# Patient Record
Sex: FEMALE | Race: WHITE | Employment: FULL TIME | URBAN - METROPOLITAN AREA
[De-identification: names, ages, dates, MRNs, and addresses within clinical notes are randomized per-mention and may not be internally consistent; named-entity substitution may affect disease eponyms.]

---

## 2017-01-03 ENCOUNTER — AMBULATORY - HEALTHEAST (OUTPATIENT)
Dept: EDUCATION SERVICES | Facility: CLINIC | Age: 37
End: 2017-01-03

## 2017-01-03 ENCOUNTER — COMMUNICATION - HEALTHEAST (OUTPATIENT)
Dept: FAMILY MEDICINE | Facility: CLINIC | Age: 37
End: 2017-01-03

## 2017-01-03 DIAGNOSIS — O24.414 INSULIN CONTROLLED GESTATIONAL DIABETES MELLITUS (GDM) DURING PREGNANCY, ANTEPARTUM: ICD-10-CM

## 2017-01-04 ENCOUNTER — COMMUNICATION - HEALTHEAST (OUTPATIENT)
Dept: EDUCATION SERVICES | Facility: CLINIC | Age: 37
End: 2017-01-04

## 2017-01-08 ENCOUNTER — COMMUNICATION - HEALTHEAST (OUTPATIENT)
Dept: EDUCATION SERVICES | Facility: CLINIC | Age: 37
End: 2017-01-08

## 2017-01-08 DIAGNOSIS — O24.410 DIET CONTROLLED GESTATIONAL DIABETES MELLITUS (GDM), ANTEPARTUM: ICD-10-CM

## 2017-01-09 ENCOUNTER — COMMUNICATION - HEALTHEAST (OUTPATIENT)
Dept: ENDOCRINOLOGY | Facility: CLINIC | Age: 37
End: 2017-01-09

## 2017-01-09 ENCOUNTER — OFFICE VISIT - HEALTHEAST (OUTPATIENT)
Dept: EDUCATION SERVICES | Facility: CLINIC | Age: 37
End: 2017-01-09

## 2017-01-09 ENCOUNTER — OFFICE VISIT - HEALTHEAST (OUTPATIENT)
Dept: ENDOCRINOLOGY | Facility: CLINIC | Age: 37
End: 2017-01-09

## 2017-01-09 DIAGNOSIS — O24.414 INSULIN CONTROLLED GESTATIONAL DIABETES MELLITUS (GDM) IN THIRD TRIMESTER: ICD-10-CM

## 2017-01-09 DIAGNOSIS — O24.419 GESTATIONAL DIABETES: ICD-10-CM

## 2017-01-09 ASSESSMENT — MIFFLIN-ST. JEOR: SCORE: 1503.55

## 2017-01-11 ENCOUNTER — COMMUNICATION - HEALTHEAST (OUTPATIENT)
Dept: EDUCATION SERVICES | Facility: CLINIC | Age: 37
End: 2017-01-11

## 2017-01-13 ENCOUNTER — AMBULATORY - HEALTHEAST (OUTPATIENT)
Dept: SCHEDULING | Facility: CLINIC | Age: 37
End: 2017-01-13

## 2017-01-13 DIAGNOSIS — O24.419 GDM (GESTATIONAL DIABETES MELLITUS): ICD-10-CM

## 2017-01-16 ENCOUNTER — HOSPITAL ENCOUNTER (OUTPATIENT)
Dept: ULTRASOUND IMAGING | Facility: HOSPITAL | Age: 37
Discharge: HOME | End: 2017-01-16
Attending: FAMILY MEDICINE

## 2017-01-16 ENCOUNTER — HOSPITAL ENCOUNTER (OUTPATIENT)
Dept: OBGYN | Facility: HOSPITAL | Age: 37
Discharge: HOME | End: 2017-01-16
Attending: FAMILY MEDICINE | Admitting: FAMILY MEDICINE

## 2017-01-16 DIAGNOSIS — O24.419 GDM (GESTATIONAL DIABETES MELLITUS): ICD-10-CM

## 2017-01-23 ENCOUNTER — HOSPITAL ENCOUNTER (OUTPATIENT)
Dept: ULTRASOUND IMAGING | Facility: CLINIC | Age: 37
Discharge: HOME | End: 2017-01-23
Attending: FAMILY MEDICINE

## 2017-01-23 ENCOUNTER — HOSPITAL ENCOUNTER (OUTPATIENT)
Dept: ADMINISTRATIVE | Facility: OTHER | Age: 37
Discharge: HOME | End: 2017-01-23
Attending: FAMILY MEDICINE | Admitting: FAMILY MEDICINE

## 2017-01-23 DIAGNOSIS — O24.419 GDM (GESTATIONAL DIABETES MELLITUS): ICD-10-CM

## 2017-01-24 ENCOUNTER — OFFICE VISIT - HEALTHEAST (OUTPATIENT)
Dept: ENDOCRINOLOGY | Facility: CLINIC | Age: 37
End: 2017-01-24

## 2017-01-24 ENCOUNTER — OFFICE VISIT - HEALTHEAST (OUTPATIENT)
Dept: EDUCATION SERVICES | Facility: CLINIC | Age: 37
End: 2017-01-24

## 2017-01-24 DIAGNOSIS — O24.414 INSULIN CONTROLLED GESTATIONAL DIABETES MELLITUS (GDM) IN THIRD TRIMESTER: ICD-10-CM

## 2017-01-24 ASSESSMENT — MIFFLIN-ST. JEOR: SCORE: 1515.34

## 2017-01-31 ENCOUNTER — RECORDS - HEALTHEAST (OUTPATIENT)
Dept: ADMINISTRATIVE | Facility: OTHER | Age: 37
End: 2017-01-31

## 2017-02-02 ENCOUNTER — HOSPITAL ENCOUNTER (OUTPATIENT)
Dept: ADMINISTRATIVE | Facility: OTHER | Age: 37
Discharge: HOME | End: 2017-02-02
Attending: FAMILY MEDICINE | Admitting: FAMILY MEDICINE

## 2017-02-02 ENCOUNTER — HOSPITAL ENCOUNTER (OUTPATIENT)
Dept: ULTRASOUND IMAGING | Facility: CLINIC | Age: 37
Discharge: HOME | End: 2017-02-02
Attending: FAMILY MEDICINE

## 2017-02-02 DIAGNOSIS — O24.419 GDM (GESTATIONAL DIABETES MELLITUS): ICD-10-CM

## 2017-02-06 ENCOUNTER — OFFICE VISIT - HEALTHEAST (OUTPATIENT)
Dept: ENDOCRINOLOGY | Facility: CLINIC | Age: 37
End: 2017-02-06

## 2017-02-06 ENCOUNTER — OFFICE VISIT - HEALTHEAST (OUTPATIENT)
Dept: EDUCATION SERVICES | Facility: CLINIC | Age: 37
End: 2017-02-06

## 2017-02-06 DIAGNOSIS — O24.414 INSULIN CONTROLLED GESTATIONAL DIABETES MELLITUS (GDM) IN THIRD TRIMESTER: ICD-10-CM

## 2017-02-06 ASSESSMENT — MIFFLIN-ST. JEOR: SCORE: 1521.69

## 2017-02-09 ENCOUNTER — HOSPITAL ENCOUNTER (OUTPATIENT)
Dept: ULTRASOUND IMAGING | Facility: CLINIC | Age: 37
Discharge: HOME | End: 2017-02-09
Attending: FAMILY MEDICINE

## 2017-02-09 ENCOUNTER — HOSPITAL ENCOUNTER (OUTPATIENT)
Dept: ADMINISTRATIVE | Facility: OTHER | Age: 37
Discharge: HOME | End: 2017-02-09
Attending: FAMILY MEDICINE | Admitting: FAMILY MEDICINE

## 2017-02-09 ENCOUNTER — HOSPITAL ENCOUNTER (OUTPATIENT)
Dept: LABOR AND DELIVERY | Facility: CLINIC | Age: 37
Discharge: HOME | End: 2017-02-09
Attending: FAMILY MEDICINE

## 2017-02-09 DIAGNOSIS — O24.419 GDM (GESTATIONAL DIABETES MELLITUS): ICD-10-CM

## 2017-02-09 ASSESSMENT — MIFFLIN-ST. JEOR: SCORE: 1534.51

## 2017-02-13 ENCOUNTER — COMMUNICATION - HEALTHEAST (OUTPATIENT)
Dept: ADMINISTRATIVE | Facility: CLINIC | Age: 37
End: 2017-02-13

## 2017-02-14 ENCOUNTER — HOSPITAL ENCOUNTER (OUTPATIENT)
Dept: ULTRASOUND IMAGING | Facility: CLINIC | Age: 37
Discharge: HOME | End: 2017-02-14
Attending: FAMILY MEDICINE

## 2017-02-14 ENCOUNTER — HOSPITAL ENCOUNTER (OUTPATIENT)
Dept: LABOR AND DELIVERY | Facility: CLINIC | Age: 37
Discharge: HOME | End: 2017-02-14
Attending: FAMILY MEDICINE

## 2017-02-14 ENCOUNTER — COMMUNICATION - HEALTHEAST (OUTPATIENT)
Dept: EDUCATION SERVICES | Facility: CLINIC | Age: 37
End: 2017-02-14

## 2017-02-14 ENCOUNTER — HOSPITAL ENCOUNTER (OUTPATIENT)
Dept: ADMINISTRATIVE | Facility: OTHER | Age: 37
Discharge: HOME | End: 2017-02-14
Attending: FAMILY MEDICINE | Admitting: FAMILY MEDICINE

## 2017-02-14 DIAGNOSIS — O24.419 GDM (GESTATIONAL DIABETES MELLITUS): ICD-10-CM

## 2017-02-14 DIAGNOSIS — O24.419 GESTATIONAL DIABETES: ICD-10-CM

## 2017-02-23 ENCOUNTER — HOSPITAL ENCOUNTER (OUTPATIENT)
Dept: LABOR AND DELIVERY | Facility: CLINIC | Age: 37
Discharge: HOME | End: 2017-02-23
Attending: FAMILY MEDICINE

## 2017-02-23 ENCOUNTER — HOSPITAL ENCOUNTER (OUTPATIENT)
Dept: ADMINISTRATIVE | Facility: OTHER | Age: 37
Discharge: HOME | End: 2017-02-23
Attending: FAMILY MEDICINE | Admitting: FAMILY MEDICINE

## 2017-02-23 ENCOUNTER — HOSPITAL ENCOUNTER (OUTPATIENT)
Dept: ULTRASOUND IMAGING | Facility: CLINIC | Age: 37
Discharge: HOME | End: 2017-02-23
Attending: FAMILY MEDICINE

## 2017-02-23 DIAGNOSIS — O24.419 GDM (GESTATIONAL DIABETES MELLITUS): ICD-10-CM

## 2017-02-27 ENCOUNTER — COMMUNICATION - HEALTHEAST (OUTPATIENT)
Dept: ADMINISTRATIVE | Facility: CLINIC | Age: 37
End: 2017-02-27

## 2017-02-27 ENCOUNTER — RECORDS - HEALTHEAST (OUTPATIENT)
Dept: ADMINISTRATIVE | Facility: OTHER | Age: 37
End: 2017-02-27

## 2017-03-01 ENCOUNTER — HOSPITAL ENCOUNTER (OUTPATIENT)
Dept: ULTRASOUND IMAGING | Facility: CLINIC | Age: 37
Discharge: HOME | End: 2017-03-01
Attending: FAMILY MEDICINE

## 2017-03-01 DIAGNOSIS — O24.419 GDM (GESTATIONAL DIABETES MELLITUS): ICD-10-CM

## 2017-03-03 ENCOUNTER — HOSPITAL ENCOUNTER (OUTPATIENT)
Dept: LABOR AND DELIVERY | Facility: CLINIC | Age: 37
Discharge: HOME | End: 2017-03-03

## 2017-03-04 ENCOUNTER — ANESTHESIA - HEALTHEAST (OUTPATIENT)
Dept: ADMINISTRATIVE | Facility: OTHER | Age: 37
End: 2017-03-04

## 2017-03-05 ENCOUNTER — HOME CARE/HOSPICE - HEALTHEAST (OUTPATIENT)
Dept: HOME HEALTH SERVICES | Facility: HOME HEALTH | Age: 37
End: 2017-03-05

## 2017-03-13 ENCOUNTER — COMMUNICATION - HEALTHEAST (OUTPATIENT)
Dept: OBGYN | Facility: CLINIC | Age: 37
End: 2017-03-13

## 2017-10-26 ENCOUNTER — OFFICE VISIT (OUTPATIENT)
Dept: FAMILY MEDICINE | Facility: CLINIC | Age: 37
End: 2017-10-26
Payer: COMMERCIAL

## 2017-10-26 VITALS
WEIGHT: 148 LBS | DIASTOLIC BLOOD PRESSURE: 63 MMHG | HEART RATE: 88 BPM | HEIGHT: 67 IN | BODY MASS INDEX: 23.23 KG/M2 | OXYGEN SATURATION: 98 % | RESPIRATION RATE: 16 BRPM | TEMPERATURE: 98 F | SYSTOLIC BLOOD PRESSURE: 99 MMHG

## 2017-10-26 DIAGNOSIS — R68.89 FLU-LIKE SYMPTOMS: Primary | ICD-10-CM

## 2017-10-26 LAB
FLUAV+FLUBV AG SPEC QL: NEGATIVE
FLUAV+FLUBV AG SPEC QL: NEGATIVE
SPECIMEN SOURCE: NORMAL

## 2017-10-26 PROCEDURE — 87804 INFLUENZA ASSAY W/OPTIC: CPT | Performed by: NURSE PRACTITIONER

## 2017-10-26 PROCEDURE — 99214 OFFICE O/P EST MOD 30 MIN: CPT | Performed by: NURSE PRACTITIONER

## 2017-10-26 RX ORDER — OSELTAMIVIR PHOSPHATE 75 MG/1
75 CAPSULE ORAL 2 TIMES DAILY
Qty: 10 CAPSULE | Refills: 0 | Status: SHIPPED | OUTPATIENT
Start: 2017-10-26

## 2017-10-26 NOTE — PROGRESS NOTES
"  SUBJECTIVE:   Kristin Page is a 37 year old female who presents to clinic today for the following health issues:      RESPIRATORY SYMPTOMS      Duration: yesterday afternoon     Description  Fever, chills  and headache, bodyaches and cough    Severity: severe    Accompanying signs and symptoms: nausea     History (predisposing factors):  none    Precipitating or alleviating factors: None    Therapies tried and outcome:  Tylenol at 5 am     Problem list and histories reviewed & adjusted, as indicated.  Additional history: as documented    Son has neuroblastoma is starting high dose chemo tomorrow.  Today having eval for bone marrow testing.  2 other kids at home are sick.      Reviewed and updated as needed this visit by clinical staffTobacco  Allergies  Meds  Problems  Med Hx  Surg Hx  Fam Hx  Soc Hx        Reviewed and updated as needed this visit by Provider  Tobacco  Allergies  Meds  Problems  Med Hx  Surg Hx  Fam Hx  Soc Hx          ROS:  Constitutional, HEENT, cardiovascular, pulmonary, GI, , musculoskeletal, neuro, skin, endocrine and psych systems are negative, except as otherwise noted.      OBJECTIVE:   BP 99/63  Pulse 88  Temp 98  F (36.7  C) (Oral)  Resp 16  Ht 5' 7\" (1.702 m)  Wt 148 lb (67.1 kg)  SpO2 98%  Breastfeeding? Yes  BMI 23.18 kg/m2  Body mass index is 23.18 kg/(m^2).  GENERAL: healthy, alert and no distress  EYES: Eyes grossly normal to inspection, PERRL and conjunctivae and sclerae normal  HENT: ear canals and TM's normal, nose and mouth without ulcers or lesions  NECK: no adenopathy, no asymmetry, masses, or scars and thyroid normal to palpation  RESP: lungs clear to auscultation - no rales, rhonchi or wheezes  CV: regular rate and rhythm, normal S1 S2, no S3 or S4, no murmur, click or rub, no peripheral edema and peripheral pulses strong  ABDOMEN: soft, nontender, no hepatosplenomegaly, no masses and bowel sounds normal  MS: no gross musculoskeletal defects " noted, no edema  SKIN: no suspicious lesions or rashes    Diagnostic Test Results:  Results for orders placed or performed in visit on 10/26/17 (from the past 24 hour(s))   Influenza A/B antigen   Result Value Ref Range    Influenza A/B Agn Specimen Nasopharyngeal     Influenza A Negative NEG^Negative    Influenza B Negative NEG^Negative       ASSESSMENT/PLAN:       ICD-10-CM    1. Flu-like symptoms R68.89 Influenza A/B antigen     oseltamivir (TAMIFLU) 75 MG capsule     I think this is primarily related to a viral illness given the various associated symptoms.  Went over the treatment of viral illnesses, which is primarily supportive.    Recheck if not improving as expected  Normal exam.  Negative flu swab.  Will treat with tamiflu based on sons diagnosis/immune status.  F/u PRN.      See Patient Instructions    WEI Reveles CNP  [facility]  tamiflu

## 2017-10-26 NOTE — MR AVS SNAPSHOT
"              After Visit Summary   10/26/2017    Kristin Page    MRN: [id number]           Patient Information     Date Of Birth          1980        Visit Information        Provider Department      10/26/2017 9:20 AM Bonnie Bello APRN CNP [facility]        Today's Diagnoses     Flu-like symptoms    -  1       Follow-ups after your visit        Who to contact     If you have questions or need follow up information about today's clinic visit or your schedule please contact [facility] directly at [phone].  Normal or non-critical lab and imaging results will be communicated to you by [employer] System[employer], letter or phone within 4 business days after the clinic has received the results. If you do not hear from us within 7 days, please contact the clinic through [employer] System[employer]art or phone. If you have a critical or abnormal lab result, we will notify you by phone as soon as possible.  Submit refill requests through [employer] or call your pharmacy and they will forward the refill request to us. Please allow 3 business days for your refill to be completed.          Additional Information About Your Visit        MyChart Information     Tilt lets you send messages to your doctor, view your test results, renew your prescriptions, schedule appointments and more. To sign up, go to [email or web address] . Click on \"Log in\" on the left side of the screen, which will take you to the Welcome page. Then click on \"Sign up Now\" on the right side of the page.     You will be asked to enter the access code listed below, as well as some personal information. Please follow the directions to create your username and password.     Your access code is: [email or web address]  Expires: 2018 10:33 AM     Your access code will  in 90 days. If you need help or a new code, please call your [facility] or [phone].        Care EveryWhere ID     This is your Care EveryWhere ID. This could be used " "by other organizations to access your [place] medical records  [id number]        Your Vitals Were     Pulse Temperature Respirations Height Pulse Oximetry Breastfeeding?    88 98  F (36.7  C) (Oral) 16 5' 7\" (1.702 m) 98% Yes    BMI (Body Mass Index)                   23.18 kg/m2            Blood Pressure from Last 3 Encounters:   10/26/17 99/63   11/04/16 109/69   09/28/16 93/60    Weight from Last 3 Encounters:   10/26/17 148 lb (67.1 kg)   11/04/16 168 lb 1.6 oz (76.2 kg)   09/28/16 157 lb (71.2 kg)              We Performed the Following     Influenza A/B antigen          Today's Medication Changes          These changes are accurate as of: 10/26/17 10:33 AM.  If you have any questions, ask your nurse or doctor.               Start taking these medicines.        Dose/Directions    oseltamivir 75 MG capsule   Commonly known as:  TAMIFLU   Used for:  Flu-like symptoms   Started by:  Bonnie Bello APRN CNP        Dose:  75 mg   Take 1 capsule (75 mg) by mouth 2 times daily   Quantity:  10 capsule   Refills:  0            Where to get your medicines      These medications were sent to [facility] - [place], [place] - [address]  [address], [place] [address]     Phone:  [phone]     oseltamivir 75 MG capsule                Primary Care Provider    Physician No Ref-Primary       NO REF-PRIMARY PHYSICIAN        Equal Access to Services     MALATHI CASTILLO AH: Hadii geovanny huio Sotesha, waaxda luqadaha, qaybta kaalmada adeegyada, francisco javier merino . So [facility] [phone].    ATENCIÓN: Si habla español, tiene a valladares disposición servicios gratuitos de asistencia lingüística. James al [phone].    We comply with applicable federal civil rights laws and [place] laws. We do not discriminate on the basis of race, color, national origin, age, disability, sex, sexual orientation, or gender identity.            Thank you!     Thank you for choosing [place] [facility]  for your care. Our goal is always to provide you with excellent care. Hearing back from our patients is one way we can continue to improve our services. Please take a few minutes to complete the written survey that you may receive in the mail after your visit with us. Thank you!             Your Updated Medication List - Protect others around you: Learn how to safely use, store and throw away your medicines at www.disposemymeds.org.          This list is accurate as of: 10/26/17 10:33 AM.  Always use your most recent med list.                   Brand Name Dispense Instructions for use Diagnosis    OMEGA-3 FISH OIL PO      Take 1 g by mouth    Normal pregnancy in multigravida in second trimester       oseltamivir 75 MG capsule    TAMIFLU    10 capsule    Take 1 capsule (75 mg) by mouth 2 times daily    Flu-like symptoms       PRENATAL VITAMINS PO      Take 1 tablet by mouth daily.

## 2017-10-26 NOTE — NURSING NOTE
"Chief Complaint   Patient presents with     Sick       Initial BP 99/63  Pulse 88  Temp 98  F (36.7  C) (Oral)  Resp 16  Ht 5' 7\" (1.702 m)  Wt 148 lb (67.1 kg)  SpO2 98%  Breastfeeding? Yes  BMI 23.18 kg/m2 Estimated body mass index is 23.18 kg/(m^2) as calculated from the following:    Height as of this encounter: 5' 7\" (1.702 m).    Weight as of this encounter: 148 lb (67.1 kg).  Medication Reconciliation: complete     Juan Daniel Alcala MA       "

## 2017-10-27 ENCOUNTER — CARE COORDINATION (OUTPATIENT)
Dept: TRANSPLANT | Facility: CLINIC | Age: 37
End: 2017-10-27

## 2017-10-27 DIAGNOSIS — J06.9 VIRAL UPPER RESPIRATORY TRACT INFECTION: Primary | ICD-10-CM

## 2017-10-27 LAB
FLUAV+FLUBV RNA SPEC QL NAA+PROBE: NEGATIVE
FLUAV+FLUBV RNA SPEC QL NAA+PROBE: NEGATIVE
RSV RNA SPEC NAA+PROBE: NEGATIVE
SPECIMEN SOURCE: NORMAL

## 2017-10-27 PROCEDURE — 87631 RESP VIRUS 3-5 TARGETS: CPT | Performed by: NURSE PRACTITIONER

## 2017-10-27 NOTE — PROGRESS NOTES
Medical release of information obtained 10/26/2017 to allow [facility] Blood and Marrow Transplant team to access medical records to obtain results from viral studies sent.  Medical release to be scanned in to [employer].

## 2018-11-07 ENCOUNTER — ALLIED HEALTH/NURSE VISIT (OUTPATIENT)
Dept: NURSING | Facility: CLINIC | Age: 38
End: 2018-11-07
Payer: COMMERCIAL

## 2018-11-07 DIAGNOSIS — Z23 NEED FOR PROPHYLACTIC VACCINATION AND INOCULATION AGAINST INFLUENZA: Primary | ICD-10-CM

## 2018-11-07 PROCEDURE — 99207 ZZC NO CHARGE NURSE ONLY: CPT

## 2018-11-07 PROCEDURE — 90686 IIV4 VACC NO PRSV 0.5 ML IM: CPT

## 2018-11-07 PROCEDURE — 90471 IMMUNIZATION ADMIN: CPT

## 2018-11-07 NOTE — MR AVS SNAPSHOT
After Visit Summary   11/7/2018    Kristin Page    MRN: [id number]           Patient Information     Date Of Birth          1980        Visit Information        Provider Department      11/7/2018 3:45 PM [facility] NURSE [facility]        Today's Diagnoses     Need for prophylactic vaccination and inoculation against influenza    -  1       Follow-ups after your visit        Who to contact     If you have questions or need follow up information about today's clinic visit or your schedule please contact [facility] directly at [phone].  Normal or non-critical lab and imaging results will be communicated to you by MyChart, letter or phone within 4 business days after the clinic has received the results. If you do not hear from us within 7 days, please contact the clinic through MyChart or phone. If you have a critical or abnormal lab result, we will notify you by phone as soon as possible.  Submit refill requests through [employer] or call your pharmacy and they will forward the refill request to us. Please allow 3 business days for your refill to be completed.          Additional Information About Your Visit        Care EveryWhere ID     This is your Care EveryWhere ID. This could be used by other organizations to access your [place] medical records  [id number]         Blood Pressure from Last 3 Encounters:   10/26/17 99/63   11/04/16 109/69   09/28/16 93/60    Weight from Last 3 Encounters:   10/26/17 148 lb (67.1 kg)   11/04/16 168 lb 1.6 oz (76.2 kg)   09/28/16 157 lb (71.2 kg)              We Performed the Following     FLU VACCINE, SPLIT VIRUS, IM (QUADRIVALENT) [88804]- >3 YRS     Vaccine Administration, Initial [34915]        Primary Care Provider Fax #    Physician No Ref-Primary [phone]       No address on file        Equal Access to Services     MALATHI CASTILLO AH: jose Huffman qaybta kaalmada adeegyada,  francisco javier ayalacora rodgers'aan ah. So [facility] [phone].    ATENCIÓN: Si habla bennie, tiene a valladares disposición servicios gratuitos de asistencia lingüística. James al [phone].    We comply with applicable federal civil rights laws and [place] laws. We do not discriminate on the basis of race, color, national origin, age, disability, sex, sexual orientation, or gender identity.            Thank you!     Thank you for choosing [facility]  for your care. Our goal is always to provide you with excellent care. Hearing back from our patients is one way we can continue to improve our services. Please take a few minutes to complete the written survey that you may receive in the mail after your visit with us. Thank you!             Your Updated Medication List - Protect others around you: Learn how to safely use, store and throw away your medicines at www.disposemymeds.org.          This list is accurate as of 11/7/18  4:03 PM.  Always use your most recent med list.                   Brand Name Dispense Instructions for use Diagnosis    OMEGA-3 FISH OIL PO      Take 1 g by mouth    Normal pregnancy in multigravida in second trimester       oseltamivir 75 MG capsule    TAMIFLU    10 capsule    Take 1 capsule (75 mg) by mouth 2 times daily    Flu-like symptoms       PRENATAL VITAMINS PO      Take 1 tablet by mouth daily.

## 2018-11-07 NOTE — PROGRESS NOTES
Prior to injection verified patient identity using patient's name and date of birth.  Due to injection administration, patient instructed to remain in clinic for 15 minutes  afterwards, and to report any adverse reaction to me immediately.      Injectable Influenza Immunization Documentation    1.  Is the person to be vaccinated sick today?   No    2. Does the person to be vaccinated have an allergy to a component   of the vaccine?   No  Egg Allergy Algorithm Link    3. Has the person to be vaccinated ever had a serious reaction   to influenza vaccine in the past?   No    4. Has the person to be vaccinated ever had Guillain-Barré syndrome?   No    Form completed by Jazmine Uribe MA

## 2019-11-07 ENCOUNTER — ALLIED HEALTH/NURSE VISIT (OUTPATIENT)
Dept: NURSING | Facility: CLINIC | Age: 39
End: 2019-11-07
Payer: COMMERCIAL

## 2019-11-07 DIAGNOSIS — Z23 NEED FOR PROPHYLACTIC VACCINATION AND INOCULATION AGAINST INFLUENZA: Primary | ICD-10-CM

## 2019-11-07 PROCEDURE — 99207 ZZC NO CHARGE NURSE ONLY: CPT

## 2019-11-07 PROCEDURE — 90471 IMMUNIZATION ADMIN: CPT

## 2019-11-07 PROCEDURE — 90682 RIV4 VACC RECOMBINANT DNA IM: CPT

## 2021-03-19 ENCOUNTER — IMMUNIZATION (OUTPATIENT)
Dept: NURSING | Facility: CLINIC | Age: 41
End: 2021-03-19
Payer: COMMERCIAL

## 2021-03-19 PROCEDURE — 91300 PR COVID VAC PFIZER DIL RECON 30 MCG/0.3 ML IM: CPT

## 2021-03-19 PROCEDURE — 0001A PR COVID VAC PFIZER DIL RECON 30 MCG/0.3 ML IM: CPT

## 2021-04-09 ENCOUNTER — IMMUNIZATION (OUTPATIENT)
Dept: NURSING | Facility: CLINIC | Age: 41
End: 2021-04-09
Attending: PHARMACIST
Payer: COMMERCIAL

## 2021-04-09 PROCEDURE — 0002A PR COVID VAC PFIZER DIL RECON 30 MCG/0.3 ML IM: CPT

## 2021-04-09 PROCEDURE — 91300 PR COVID VAC PFIZER DIL RECON 30 MCG/0.3 ML IM: CPT

## 2021-04-17 ENCOUNTER — HEALTH MAINTENANCE LETTER (OUTPATIENT)
Age: 41
End: 2021-04-17

## 2021-05-30 VITALS — WEIGHT: 179.4 LBS | HEIGHT: 67 IN | BODY MASS INDEX: 28.16 KG/M2

## 2021-05-30 VITALS — HEIGHT: 67 IN | BODY MASS INDEX: 28.38 KG/M2 | WEIGHT: 180.8 LBS

## 2021-05-30 VITALS — BODY MASS INDEX: 27.43 KG/M2 | WEIGHT: 181 LBS | HEIGHT: 68 IN

## 2021-05-30 VITALS — BODY MASS INDEX: 27.75 KG/M2 | HEIGHT: 67 IN | WEIGHT: 176.8 LBS

## 2021-05-30 VITALS — BODY MASS INDEX: 27.69 KG/M2 | WEIGHT: 176.8 LBS

## 2021-06-08 NOTE — PROGRESS NOTES
"[facility] GDM Care Plan      Assessment/Plan: pt seen today for f/u. She brings in BG log book, she is up to 16 units at HS and will increase to 18 units if she has another FBG elevation. She is having a couple pp elevations after meals. Most are explainable. Pt was provided with a novolog sample to use 2 units prior to eating these meals. (meals she knows will elevate her for example 2 pieces of toast for breakfast, or eating out.) Reviewed signs and symptoms of hypoglycemia and treatment.   Ketones are normal. Pt feels well. She will call prior to delivery with any questions/concerns or 3 or more elevations/week. Otherwise she does not need to f/u as she is 36 weeks. Discussed pp care.       Time: 30  Visit Type: pregnancy clinic   Provider: Leni Sutherland   Provider's Diagnosis (per referral form): Gestational (648.83)  OGTT:1 hour- 192  EDC: Estimated Date of Delivery: 3/4/17, having a boy  Pregnancy #: 3, 2 boys at home  Previous GDM: No    Medications:   Current Outpatient Prescriptions:      acetone, urine, test Strp, Use 1 per day., Disp: 100 strip, Rfl: 0     blood glucose test strips, Use 1 each As Directed 4 (four) times a day. (Patient taking differently: Use 1 each As Directed 4 (four) times a day. One Touch Verio), Disp: 150 strip, Rfl: 3     generic lancets (ACCU-CHEK FASTCLIX), Use 1 - 6 needle barrel per week., Disp: 50 each, Rfl: 1     insulin detemir (LEVEMIR FLEXTOUCH) 100 unit/mL (3 mL) pen, Inject 8 Units under the skin bedtime. (Patient taking differently: Inject 14 Units under the skin bedtime. ), Disp: 3 mL, Rfl: 0     OMEGA-3/DHA/EPA/FISH OIL (FISH OIL-OMEGA-3 FATTY ACIDS) 300-1,000 mg capsule, Take 2 g by mouth daily., Disp: , Rfl:      pen needle, diabetic 32 gauge x 5/32\" Ndle, Inject 1 Units under the skin daily., Disp: 100 each, Rfl: 1     prenatal vitamin iron-folic acid 27mg-0.8mg (PRENATAL S) 27 mg iron- 800 mcg Tab tablet, Take 1 tablet by mouth daily., Disp: , Rfl:       BG " monitoring goals: Fasting <95; 1 hour post start of meal <140. Test 4 x per day.  Check fasting a.m. ketones: Yes  GDM meal pattern/carb counting taught per guidelines: Yes    Past Goals:  Nutrition: GDM meal plan MET  Activity: Walking after meals when able/staying active MET  Monitoring: BG 4x/day as directed, ketones every morning MET      New Goals:  Nutrition: GDM meal plan   Activity: Walking after meals when able/staying active   Monitoring: BG 4x/day as directed, ketones every morning    DIABETES CARE PLAN AND EDUCATION RECORD    Gestational Diabetes Disease Process/Preconception Care/Management During Pregnancy/Postpartum:Discussed    Meter (per above goals): Discussed    Nutrition Management    Weight: Assessed and Discussed  Portions/Balance: Assessed and Discussed  Carb ID/Count: Assessed and Discussed  Label Reading: Assessed and Discussed  Menu Planning: Assessed and Discussed  Dining Out: Assessed and Discussed  Physical Activity: Assessed and Discussed    Acute Complications: Prevent, Detect, Treat:    Goal Setting and Problem Solving: Assessed and Discussed  Barriers: Assessed and Discussed  Psychosocial Adjustments: Assessed and Discussed      I agree with the aforementioned diabetes plan.  Ruby Zaragozacoco  [facility] Endocrinology  2/6/2017  9:54 AM

## 2021-06-08 NOTE — PROGRESS NOTES
[facility]  ENDOCRINOLOGY    Gestational Diabetes 2017    Kristin Page, 1980, [id number]          Reason for visit      1. Insulin controlled gestational diabetes mellitus (GDM) in third trimester        HPI     Kristin Page is a very pleasant 36 y.o. old female who presents for GESTATIONAL Diabetes Mellitus. She is currently 32 weeks pregnant . Due date is 3/4/17 Diagnosed with GDM based on an OGTT. She hasnot had  GDM in prior pregnancies. She has had two large babies before this pregnancy. One weighed 8 lb 14 oz, and the other weighed 9 lb 5 oz. They are both boys.  Current carbohydrate intake:consistent with recommendations of 30g-60g-60g.  I have reviewed her blood glucose logs and note that the:  Fasting readings are:in range on current regimen  Postprandial readings are:in range on current regimen  Current NPH dose: 14  Current Prandial insulin: 0/0/0  Blood glucose logs/meter brought in and data reviewed and incorporated into decision-making.  Planned delivery at: Unsure  OBGYN: Dr Jose Alcaraz     Therapy/Interventions in the past:  She has been seen by the Diabetes Educator- and has received instruction on carbohydrate counting and  consistency.  Records from referring provider and other sources have also been reviewed and incorporated into decision-making.      TODAY:  Kristin is doing well today and proudly reports that she has been taking 14 units of Levemir at  for some time.  She is having occasional elevations after meals, but not enough to merit starting prandial insulin.  She is not having any ketones.  Baby is moving appropriately and she is having no swelling. They are not planning on doing any induction.     Past Medical History       Patient Active Problem List   Diagnosis     Varicose Veins     Metrorrhagia     Insulin controlled gestational diabetes mellitus (GDM) in third trimester        Past Surgical History     History reviewed. No pertinent past surgical  "history.    Family History     History reviewed. No pertinent family history.    Social History     Social History   Substance Use Topics     Smoking status: Never Smoker     Smokeless tobacco: Never Used     Alcohol use No       Review of Systems     Patient has no polyuria or polydipsia, no chest pain, dyspnea or TIA's, no numbness, tingling or pain in extremities  Remainder negative except as noted in HPI.      Vital Signs     Visit Vitals     BP 96/56 (Patient Site: Right Arm, Patient Position: Sitting, Cuff Size: Adult Regular)     Pulse 70     Ht 5' 7.25\" (1.708 m)     Wt 179 lb 6.4 oz (81.4 kg)     BMI 27.89 kg/m2     Wt Readings from Last 3 Encounters:   01/24/17 179 lb 6.4 oz (81.4 kg)   01/09/17 176 lb 12.8 oz (80.2 kg)   01/03/17 176 lb 12.8 oz (80.2 kg)       Physical Exam     GENERAL: Pleasant, alert, appropriate appearance for age. No acute distress,   HEENT: Normocephalic, atraumatic  NECK: Supple, no masses or  lymph nodes.  THYROID: No nodules or enlargement. Non-tender, no bruit  CHEST/RESPIRATORY: Normal chest wall and respirations. Clear to auscultation.  CARDIOVASCULAR: Regular rate and rhythm. S1, S2, no murmur, click, gallop, or rubs.  ABDOMEN: Gravid   LYMPHATIC: No palpable nodes in neck, supraclavicular,  SKIN: No melanosis,  ecchymosis,  vitiligo. No acanthosis nigricans  NEURO:  Non-focal, normal DTRs; no tremor.  PSYCH: Alert and oriented -appropriate affect. Orientation, judgement and memory appear intact.  MSK: No joint abnormalities, FROM in all four extremities. No kyphosis    Assessment     1. Insulin controlled gestational diabetes mellitus (GDM) in third trimester        Plan     1. GESTATIONAL DIABETES-  Adjust dose as follows:    -NPH [id number]   units. Increase by 2 units every 2 days to keep fasting blood glucose below 95mg/dL  -Novolog 0  units with breakfast  -Novolog 0 units with lunch   -Novolog 0 units with dinner  -Increase by 2 units every 2 days to keep 1 hour after " "meal blood glucose less than 140mg/dL    We reviewed glucose goals of fasting blood glucose <95 mg/dL and 1 hour post prandial blood glucose of <140 mg/dL.    Monitor blood sugar 4 times daily: Fasting  and 1 hour after each meal.  Contact  this clinic [phone] if blood glucose is not within the above-mentioned goals.     We discussed the importance of excellent glycemic control during pregnancy to limit complications such as fetal macrosomia, shoulder dystocia,  hypoglycemia and hyperbilirubinemia.  I have discussed the patient's increased risk of recurrent GDM and/or development of type 2 diabetes later in life.              Ruby Wilson  2017      Lab Results     No results found for: HGBA1C, CREATININE, MICROALBUR    No results found for: CHOL, HDL, LDLCALC, TRIG    No results found for: ALT, AST, GGT, ALKPHOS, BILITOT      Current Medications     Outpatient Medications Prior to Visit   Medication Sig Dispense Refill     acetone, urine, test Strp Use 1 per day. 100 strip 0     blood glucose test strips Use 1 each As Directed 4 (four) times a day. (Patient taking differently: Use 1 each As Directed 4 (four) times a day. One Touch Verio) 150 strip 3     generic lancets (ACCU-CHEK FASTCLIX) Use 1 - 6 needle barrel per week. 50 each 1     insulin detemir (LEVEMIR FLEXTOUCH) 100 unit/mL (3 mL) pen Inject 8 Units under the skin bedtime. (Patient taking differently: Inject 14 Units under the skin bedtime. ) 3 mL 0     OMEGA-3/DHA/EPA/FISH OIL (FISH OIL-OMEGA-3 FATTY ACIDS) 300-1,000 mg capsule Take 2 g by mouth daily.       pen needle, diabetic 32 gauge x \" Ndle Inject 1 Units under the skin daily. 100 each 1     prenatal vitamin iron-folic acid 27mg-0.8mg (PRENATAL S) 27 mg iron- 800 mcg Tab tablet Take 1 tablet by mouth daily.       No facility-administered medications prior to visit.        "

## 2021-06-08 NOTE — PROGRESS NOTES
"[facility] GDM Care Plan      Assessment/Plan: pt seen today for first pregnancy clinic apt. She is taking 8 units of levemir at HS. FBG are . Will increase to 10 units tonight. Instructed to increase by 2 every 2 above 95, this was all written out for her and she verbalized understanding. Pp readings are controlled, a couple elevations/week that are explainable. Ketones have been normal, until today - moderate. Discussed adding a little more CHO. She is eating the full amt of CHO for meals, but not always for snacks. Will call w/ any concerns prior to f/u.       Time: 30 mins.  Visit Type: pregnancy clinic   Provider: Leni Sutherland   Provider's Diagnosis (per referral form): Gestational (648.83)  OGTT:1 hour- 192  EDC: Estimated Date of Delivery: 3/4/17, having a boy  Pregnancy #: 3, 2 boys at home  Previous GDM: No  Medications:   Current Outpatient Prescriptions:      acetone, urine, test Strp, Use 1 per day., Disp: 100 strip, Rfl: 0     blood glucose test (GLUCOSE BLOOD) strips, Use 1 each As Directed as needed. Testing 4x daily; Dispense brand per patient's insurance at pharmacy discretion., Disp: , Rfl:      generic lancets (ACCU-CHEK FASTCLIX), Use 1 - 6 needle barrel per week., Disp: 50 each, Rfl: 1     insulin detemir (LEVEMIR FLEXTOUCH) 100 unit/mL (3 mL) pen, Inject 8 Units under the skin bedtime., Disp: 3 mL, Rfl: 0     OMEGA-3/DHA/EPA/FISH OIL (FISH OIL-OMEGA-3 FATTY ACIDS) 300-1,000 mg capsule, Take 2 g by mouth daily., Disp: , Rfl:      pen needle, diabetic 32 gauge x 5/32\" Ndle, Inject 1 Units under the skin daily., Disp: 100 each, Rfl: 1     prenatal vitamin iron-folic acid 27mg-0.8mg (PRENATAL S) 27 mg iron- 800 mcg Tab tablet, Take 1 tablet by mouth daily., Disp: , Rfl:       BG monitoring goals: Fasting <95; 1 hour post start of meal <140. Test 4 x per day.  Check fasting a.m. ketones: Yes  GDM meal pattern/carb counting taught per guidelines: Yes    Past Goals:  Nutrition: GDM meal plan " MET  Activity: Walking after meals when able/staying active MET  Monitoring: BG 4x/day as directed, ketones every morning MET      New Goals:  Nutrition: GDM meal plan   Activity: Walking after meals when able/staying active   Monitoring: BG 4x/day as directed, ketones every morning    DIABETES CARE PLAN AND EDUCATION RECORD    Gestational Diabetes Disease Process/Preconception Care/Management During Pregnancy/Postpartum:Discussed    Meter (per above goals): Discussed    Nutrition Management    Weight: Assessed and Discussed  Portions/Balance: Assessed and Discussed  Carb ID/Count: Assessed and Discussed  Label Reading: Assessed and Discussed  Menu Planning: Assessed and Discussed  Dining Out: Assessed and Discussed  Physical Activity: Assessed and Discussed    Acute Complications: Prevent, Detect, Treat:    Goal Setting and Problem Solving: Assessed and Discussed  Barriers: Assessed and Discussed  Psychosocial Adjustments: Assessed and Discussed    I agree with the aforementioned diabetes plan.  Ruby CORDON [facility]  [facility]  1/9/2017  12:37 PM

## 2021-06-08 NOTE — PROGRESS NOTES
"[facility] GDM Care Plan      Assessment/Plan: pt seen today for f/u. She brings in BG log book. FBG are well controlled at 14 units at HS. Pp readings are elevated 1-3x/week each meal. We talked about using Novolog PRN for bigger meals or trigger foods, however pt does not ever know when she is going to elevate, it always comes as a surprise. It is not happening often enough were we need to start insulin at meals so we will continue to monitor. Ketones are ok, small here and there. Pt is following GDM meal plan well. She will call prior to f/u with any concerns.         Time: 30  Visit Type: pregnancy clinic   Provider: Leni Sutherland   Provider's Diagnosis (per referral form): Gestational (648.83)  OGTT:1 hour- 192  EDC: Estimated Date of Delivery: 3/4/17, having a boy  Pregnancy #: 3, 2 boys at home  Previous GDM: No  Medications:   Current Outpatient Prescriptions:      acetone, urine, test Strp, Use 1 per day., Disp: 100 strip, Rfl: 0     blood glucose test strips, Use 1 each As Directed 4 (four) times a day. (Patient taking differently: Use 1 each As Directed 4 (four) times a day. One Touch Verio), Disp: 150 strip, Rfl: 3     generic lancets (ACCU-CHEK FASTCLIX), Use 1 - 6 needle barrel per week., Disp: 50 each, Rfl: 1     insulin detemir (LEVEMIR FLEXTOUCH) 100 unit/mL (3 mL) pen, Inject 8 Units under the skin bedtime. (Patient taking differently: Inject 14 Units under the skin bedtime. ), Disp: 3 mL, Rfl: 0     OMEGA-3/DHA/EPA/FISH OIL (FISH OIL-OMEGA-3 FATTY ACIDS) 300-1,000 mg capsule, Take 2 g by mouth daily., Disp: , Rfl:      pen needle, diabetic 32 gauge x 5/32\" Ndle, Inject 1 Units under the skin daily., Disp: 100 each, Rfl: 1     prenatal vitamin iron-folic acid 27mg-0.8mg (PRENATAL S) 27 mg iron- 800 mcg Tab tablet, Take 1 tablet by mouth daily., Disp: , Rfl:       BG monitoring goals: Fasting <95; 1 hour post start of meal <140. Test 4 x per day.  Check fasting a.m. ketones: Yes  GDM meal pattern/carb " counting taught per guidelines: Yes    Past Goals:  Nutrition: GDM meal plan MET  Activity: Walking after meals when able/staying active MET  Monitoring: BG 4x/day as directed, ketones every morning MET      New Goals:  Nutrition: GDM meal plan   Activity: Walking after meals when able/staying active   Monitoring: BG 4x/day as directed, ketones every morning    DIABETES CARE PLAN AND EDUCATION RECORD    Gestational Diabetes Disease Process/Preconception Care/Management During Pregnancy/Postpartum:Discussed    Meter (per above goals): Discussed    Nutrition Management    Weight: Assessed and Discussed  Portions/Balance: Assessed and Discussed  Carb ID/Count: Assessed and Discussed  Label Reading: Assessed and Discussed  Menu Planning: Assessed and Discussed  Dining Out: Assessed and Discussed  Physical Activity: Assessed and Discussed    Acute Complications: Prevent, Detect, Treat:    Goal Setting and Problem Solving: Assessed and Discussed  Barriers: Assessed and Discussed  Psychosocial Adjustments: Assessed and Discussed      I agree with the aforementioned diabetes plan.  Ruby CORDON [facility]  1/24/2017  12:27 PM

## 2021-06-08 NOTE — PROGRESS NOTES
Patient was D/C home after a reactive NST was obtained. Dr Armas was contacted and given the results of the NST and BPP  And order to D/C patient was obtained. Patient will follow up with Dr Alcaraz next week.

## 2021-06-08 NOTE — PROGRESS NOTES
[facility]  ENDOCRINOLOGY    Gestational Diabetes 2017    Kristin Page, 1980, [id number]          Reason for visit      1. Insulin controlled gestational diabetes mellitus (GDM) in third trimester        HPI     Kristin Page is a very pleasant 36 y.o. old female who presents for GESTATIONAL Diabetes Mellitus. She is currently 36w2d pregnant . Due date is 3/4/17 Diagnosed with GDM based on an OGTT. She hasnot had GDM in prior pregnancies. She has had two large babies before this pregnancy. One weighed 8 lb 14 oz, and the other weighed 9 lb 5 oz. They are both boys.  Current carbohydrate intake:consistent with recommendations of 30g-60g-60g.  I have reviewed her blood glucose logs and note that the:  Fasting readings are:in range on current regimen  Postprandial readings are:in range on current regimen  Current NPH dose: 16  Current Prandial insulin: 0/0/0  Blood glucose logs/meter brought in and data reviewed and incorporated into decision-making.  Planned delivery at: Unsure  OBGYN: Dr Jose Alcaraz    Therapy/Interventions in the past:  She has been seen by the Diabetes Educator- and has received instruction on carbohydrate counting and  consistency.  Records from referring provider and other sources have also been reviewed and incorporated into decision-making.      TODAY:  Kristin returns today looking a little worn out.  She has two other small children that she is responsible for and it shows.  She continues to not use any prandial insulin.  She has had three elevations of her FBS, with a normal one right in the middle.  She will wait to see what tomorrow morning looks like before increasing.  Her BPPs are great.  Baby is moving appropriately and she is having no swelling.  BP is perfect.  There has been no talk of an induction.    Past Medical History       Patient Active Problem List   Diagnosis     Varicose Veins     Metrorrhagia     Insulin controlled gestational diabetes mellitus (GDM)  "in third trimester        Past Surgical History     History reviewed. No pertinent surgical history.    Family History     History reviewed. No pertinent family history.    Social History     Social History   Substance Use Topics     Smoking status: Never Smoker     Smokeless tobacco: Never Used     Alcohol use No       Review of Systems     Patient has no polyuria or polydipsia, no chest pain, dyspnea or TIA's, no numbness, tingling or pain in extremities  Remainder negative except as noted in HPI.      Vital Signs     Visit Vitals     BP 92/60 (Patient Site: Left Arm, Patient Position: Sitting, Cuff Size: Adult Regular)     Pulse 84     Ht 5' 7.25\" (1.708 m)     Wt 180 lb 12.8 oz (82 kg)     BMI 28.11 kg/m2     Wt Readings from Last 3 Encounters:   02/06/17 180 lb 12.8 oz (82 kg)   01/24/17 179 lb 6.4 oz (81.4 kg)   01/09/17 176 lb 12.8 oz (80.2 kg)       Physical Exam     GENERAL: Pleasant, alert, appropriate appearance for age. No acute distress,   HEENT: Normocephalic, atraumatic  NECK: Supple, no masses or  lymph nodes.  THYROID: No nodules or enlargement. Non-tender, no bruit  CHEST/RESPIRATORY: Normal chest wall and respirations. Clear to auscultation.  CARDIOVASCULAR: Regular rate and rhythm. S1, S2, no murmur, click, gallop, or rubs.  ABDOMEN: Gravid   LYMPHATIC: No palpable nodes in neck, supraclavicular,  SKIN: No melanosis,  ecchymosis,  vitiligo. No acanthosis nigricans  NEURO:  Non-focal, normal DTRs; no tremor.  PSYCH: Alert and oriented -appropriate affect. Orientation, judgement and memory appear intact.  MSK: No joint abnormalities, FROM in all four extremities. No kyphosis    Assessment     1. Insulin controlled gestational diabetes mellitus (GDM) in third trimester        Plan     1. GESTATIONAL DIABETES-  Adjust dose as follows:    -NPH [id number]   units. Increase by 2 units every 2 days to keep fasting blood glucose below 95mg/dL  -Novolog 0  units with breakfast  -Novolog 0 units with lunch " "  -Novolog 0 units with dinner  -Increase by 0 units every 2 days to keep 1 hour after meal blood glucose less than 140mg/dL    We reviewed glucose goals of fasting blood glucose <95 mg/dL and 1 hour post prandial blood glucose of <140 mg/dL.    Monitor blood sugar 4 times daily: Fasting  and 1 hour after each meal.  Contact  this clinic [phone] if blood glucose is not within the above-mentioned goals.     We discussed the importance of excellent glycemic control during pregnancy to limit complications such as fetal macrosomia, shoulder dystocia,  hypoglycemia and hyperbilirubinemia.  I have discussed the patient's increased risk of recurrent GDM and/or development of type 2 diabetes later in life.        Pt instructed to return 6 weeks postpartum.      Ruby Wilson  2017        Lab Results     No results found for: HGBA1C, CREATININE, MICROALBUR    No results found for: CHOL, HDL, LDLCALC, TRIG    No results found for: ALT, AST, GGT, ALKPHOS, BILITOT      Current Medications     Outpatient Medications Prior to Visit   Medication Sig Dispense Refill     acetone, urine, test Strp Use 1 per day. 100 strip 0     blood glucose test strips Use 1 each As Directed 4 (four) times a day. (Patient taking differently: Use 1 each As Directed 4 (four) times a day. One Touch Verio) 150 strip 3     generic lancets (ACCU-CHEK FASTCLIX) Use 1 - 6 needle barrel per week. 50 each 1     insulin detemir (LEVEMIR FLEXTOUCH) 100 unit/mL (3 mL) pen Inject 8 Units under the skin bedtime. (Patient taking differently: Inject 16 Units under the skin bedtime. ) 3 mL 0     OMEGA-3/DHA/EPA/FISH OIL (FISH OIL-OMEGA-3 FATTY ACIDS) 300-1,000 mg capsule Take 2 g by mouth daily.       pen needle, diabetic 32 gauge x \" Ndle Inject 1 Units under the skin daily. 100 each 1     prenatal vitamin iron-folic acid 27mg-0.8mg (PRENATAL S) 27 mg iron- 800 mcg Tab tablet Take 1 tablet by mouth daily.       No facility-administered " medications prior to visit.

## 2021-06-08 NOTE — PROGRESS NOTES
FHR  Reactive. Update to DR Alcaraz. 8/8 on BPP. New orders for discharge. PT discharged. Reviewed discharged instructions with patient. All questions answered.

## 2021-06-08 NOTE — PROGRESS NOTES
[place] [facility] [place]ST following her BPP. To room 2560, EFM applied.

## 2021-06-08 NOTE — PROGRESS NOTES
[facility] GDM Care Plan      Time: 30 mins.  Visit Type: GDM Individual Follow-up  Visit #: 2    Provider: [facility]  Provider's Diagnosis (per referral form): Gestational (648.83)    Weight: 176 lb 12.8 oz (80.2 kg)  OGTT:1 hour- 192  EDC: Estimated Date of Delivery: 3/4/17, having a boy  Pregnancy #: 3, 2 boys at home  Previous GDM: No  Medications:   Current Outpatient Prescriptions:      acetone, urine, test Strp, Use 1 per day., Disp: 100 strip, Rfl: 0     blood glucose test (ACCU-CHEK SYL PLUS TEST STRP) strips, Use 1 each As Directed 4 (four) times a day., Disp: 150 each, Rfl: 5     generic lancets (ACCU-CHEK FASTCLIX), Use 1 - 6 needle barrel per week., Disp: 50 each, Rfl: 1     OMEGA-3/DHA/EPA/FISH OIL (FISH OIL-OMEGA-3 FATTY ACIDS) 300-1,000 mg capsule, Take 2 g by mouth daily., Disp: , Rfl:      prenatal vitamin iron-folic acid 27mg-0.8mg (PRENATAL S) 27 mg iron- 800 mcg Tab tablet, Take 1 tablet by mouth daily., Disp: , Rfl:   Supplements: Yes  PNV: Yes  BG: Patient continues to have high fasting readings.  Since her last visit high FBG have been 105/96/96/98/107/132/110/102/110.  She had 2 high post dinner readings related to food choices and not being etienne to walk due to travel.  Discussed high FBG and the need to start insulin today.  Instructed [facility] and find out who they recommend or she could try contacting Dr. Gatica' office and see if she would be willing to take her on.  She will start 8 units of Levemir tonight.  Instructed on Levemir pen administration, site selection, site rotation, dose adjustment and needle disposal.  Also informed she will be contacted to schedule with endocrinology and how this process would work.  No additional questions.  BG monitoring goals: Fasting <95; 1 hour post start of meal <140. Test 4 x per day.  Check fasting a.m. ketones: Yes  GDM meal pattern/carb counting taught per guidelines: Yes    DIABETES CARE PLAN AND EDUCATION  RECORD    Gestational Diabetes Disease Process/Preconception Care/Management During Pregnancy/Postpartum:Assessed and Discussed    Meter (per above goals): Assessed and Discussed    Nutrition Management    Weight: Assessed and Discussed  Portions/Balance: Assessed and discussed  Carb ID/Count: Assessed and discussed  Label Reading: Assessed and discussed  Menu Planning: Assessed and Discussed  Dining Out: Assessed and Discussed  Physical Activity: Assessed and Discussed  Medications: Assessed, Discussed and Literature provided    Acute Complications: Prevent, Detect, Treat:    Hypoglycemia: Assessed and Discussed  Hyperglycemia: Assessed and Discussed  Goal Setting and Problem Solving: Assessed and Discussed  Barriers: Assessed and Discussed  Psychosocial Adjustments: Assessed and Discussed      I agree with the aforementioned diabetes plan.  Ruby CORDON [facility]  1/3/2017  12:16 PM

## 2021-06-08 NOTE — PROGRESS NOTES
"[facility]  ENDOCRINOLOGY    Gestational Diabetes 2017    Kristin Page, 1980, [id number]          Reason for visit      1. Insulin controlled gestational diabetes mellitus (GDM) in third trimester        HPI     Kristin Page is a very pleasant 36 y.o. old female who presents for GESTATIONAL Diabetes Mellitus.  She is currently 32 weeks pregnant . Due date is 3/4/17   Diagnosed with GDM based on an OGTT. She hasnot had  GDM in prior pregnancies. She has had two large babies before this pregnancy.  One weighed 8 lb 14 oz, and the other weighed 9 lb 5 oz.  They are both boys.  Current carbohydrate intake:consistent with recommendations of 30g-60g-60g.  I have reviewed her blood glucose logs and note that the:  Fasting readings  are:in range on current regimen  Postprandial readings are:in range on current regimen  Current NPH dose:8  Current Prandial insulin: 0/0/0  Blood glucose logs/meter brought in and data reviewed and incorporated into decision-making.  Planned delivery at: Unsure  OBGYN: Dr Jose Alcaraz    Therapy/Interventions in the past:  She has been seen by the Diabetes Educator- and has received instruction on carbohydrate counting and  consistency.  Records from referring provider and other sources have also been reviewed and incorporated into decision-making.      TODAY:  Kristin is here today for the first time after starting insulin for GDM.  She is feeling well.  She reports that she has not gained any weight in the last 2 weeks and that she had a moderate to large ketone reading yesterday morning.  She has cut out the \"treats\" from her diet and it looks like her body isn't happy about it.  She does have a hx of two big babies and she doesn't really want to go that route with this one. Her numbers look good, but her FBS are all on the high side.  Baby is moving appropriately and she is having no swelling.    Past Medical History       Patient Active Problem List   Diagnosis     " "Varicose Veins     Metrorrhagia     Insulin controlled gestational diabetes mellitus (GDM) in third trimester        Past Surgical History     History reviewed. No pertinent past surgical history.    Family History     History reviewed. No pertinent family history.    Social History     Social History   Substance Use Topics     Smoking status: Never Smoker     Smokeless tobacco: Never Used     Alcohol use No       Review of Systems     Patient has no polyuria or polydipsia, no chest pain, dyspnea or TIA's, no numbness, tingling or pain in extremities  Remainder negative except as noted in HPI.      Vital Signs     Visit Vitals     BP 98/62 (Patient Site: Right Arm, Patient Position: Sitting, Cuff Size: Adult Regular)     Pulse 76     Ht 5' 7.25\" (1.708 m)     Wt 176 lb 12.8 oz (80.2 kg)     BMI 27.49 kg/m2     Wt Readings from Last 3 Encounters:   01/09/17 176 lb 12.8 oz (80.2 kg)   01/03/17 176 lb 12.8 oz (80.2 kg)   12/23/16 176 lb (79.8 kg)       Physical Exam     GENERAL: Pleasant, alert, appropriate appearance for age. No acute distress,   HEENT: Normocephalic, atraumatic  NECK: Supple, no masses or  lymph nodes.  THYROID: No nodules or enlargement. Non-tender, no bruit  CHEST/RESPIRATORY: Normal chest wall and respirations. Clear to auscultation.  CARDIOVASCULAR: Regular rate and rhythm. S1, S2, no murmur, click, gallop, or rubs.  ABDOMEN: Gravid   LYMPHATIC: No palpable nodes in neck, supraclavicular,  SKIN: No melanosis,  ecchymosis,  vitiligo. No acanthosis nigricans  NEURO:  Non-focal, normal DTRs; no tremor.  PSYCH: Alert and oriented -appropriate affect. Orientation, judgement and memory appear intact.  MSK: No joint abnormalities, FROM in all four extremities. No kyphosis    Assessment     1. Insulin controlled gestational diabetes mellitus (GDM) in third trimester        Plan   1. GESTATIONAL DIABETES-  Adjust dose as follows:    -NPH [id number]   units. Increase by 2 units every 2 days to keep fasting " "blood glucose below 95mg/dL  -Novolog 0  units with breakfast  -Novolog 0 units with lunch   -Novolog 0 units with dinner  -Increase by 2 units every 2 days to keep 1 hour after meal blood glucose less than 140mg/dL    We reviewed glucose goals of fasting blood glucose <95 mg/dL and 1 hour post prandial blood glucose of <140 mg/dL.    Monitor blood sugar 4 times daily: Fasting  and 1 hour after each meal.  Contact  this clinic [phone] if blood glucose is not within the above-mentioned goals.     We discussed the importance of excellent glycemic control during pregnancy to limit complications such as fetal macrosomia, shoulder dystocia,  hypoglycemia and hyperbilirubinemia.  I have discussed the patient's increased risk of recurrent GDM and/or development of type 2 diabetes later in life.        F/u in 2 weeks.      Ruby Wilson  2017      Lab Results     No results found for: HGBA1C, CREATININE, MICROALBUR    No results found for: CHOL, HDL, LDLCALC, TRIG    No results found for: ALT, AST, GGT, ALKPHOS, BILITOT      Current Medications     Outpatient Medications Prior to Visit   Medication Sig Dispense Refill     acetone, urine, test Strp Use 1 per day. 100 strip 0     generic lancets (ACCU-CHEK FASTCLIX) Use 1 - 6 needle barrel per week. 50 each 1     insulin detemir (LEVEMIR FLEXTOUCH) 100 unit/mL (3 mL) pen Inject 8 Units under the skin bedtime. 3 mL 0     OMEGA-3/DHA/EPA/FISH OIL (FISH OIL-OMEGA-3 FATTY ACIDS) 300-1,000 mg capsule Take 2 g by mouth daily.       pen needle, diabetic 32 gauge x \" Ndle Inject 1 Units under the skin daily. 100 each 1     prenatal vitamin iron-folic acid 27mg-0.8mg (PRENATAL S) 27 mg iron- 800 mcg Tab tablet Take 1 tablet by mouth daily.       blood glucose test (ACCU-CHEK SYL PLUS TEST STRP) strips Use 1 each As Directed 4 (four) times a day. 150 each 5     No facility-administered medications prior to visit.        "

## 2021-06-08 NOTE — PROGRESS NOTES
Patient here for NST after BPP(8/8). EFM on et testing d to patient. Having accels with moderate variability. Results called to Dr. Alcaraz.

## 2021-06-09 NOTE — ANESTHESIA POSTPROCEDURE EVALUATION
"Patient: Kristin Page  * No procedures listed *  Anesthesia type: regional    Visit Vitals     /58     Pulse 77     Temp 36.4  C (97.6  F) (Oral)     Resp 18     Ht 5' 8\" (1.727 m)     Wt 187 lb (84.8 kg)     SpO2 97%     Breastfeeding Unknown     BMI 28.43 kg/m2     No post dural puncture headache. No noted or reported complications of labor epidural. Block wearing off with just minimal tingling present in left leg.  "

## 2021-06-09 NOTE — ANESTHESIA PREPROCEDURE EVALUATION
Anesthesia Evaluation      Patient summary reviewed   No history of anesthetic complications     Airway   Mallampati: II  Neck ROM: full   Pulmonary    (-) not a smoker                         Cardiovascular   (-) hypertension   Neuro/Psych - negative ROS     Endo/Other    (+) diabetes mellitus type 2 using insulin, pregnant     GI/Hepatic/Renal    (+) GERD,             Dental - normal exam                        Anesthesia Plan  Planned anesthetic: epidural    ASA 2   Induction: intravenous   Anesthetic plan and risks discussed with: patient and spouse  Anesthesia plan special considerations: rapid sequence induction, increased risk of difficult airway,   Post-op plan: routine recovery

## 2021-06-09 NOTE — ANESTHESIA PROCEDURE NOTES
Epidural Block    Patient location during procedure: OB  Time Called: 3/4/2017 2:50 PM    Staffing:  Performing  Anesthesiologist: NOLA PARKS  Preanesthetic Checklist  Completed: patient identified, risks, benefits, and alternatives discussed, timeout performed, consent obtained, at patient's request, airway assessed, oxygen available, suction available, emergency drugs available and hand hygiene performed  Procedure  Patient position: sitting  Prep: ChloraPrep and site prepped and draped  Patient monitoring: blood pressure, heart rate and continuous pulse oximetry  Approach: midline  Location: L3-L4  Injection technique: FRANK saline  Number of Attempts:1  Needle  Needle type: Deborah   Needle gauge: 18 G     Catheter in Space: 5  Assessment  Sensory level:  No complications

## 2021-06-09 NOTE — PROGRESS NOTES
Patient her for NST after BPP (8/8). Having accels with moderate variability. Results called to Dr. Alcaraz

## 2021-07-14 PROBLEM — Z34.90 PREGNANT: Status: RESOLVED | Noted: 2017-03-03 | Resolved: 2017-03-05

## 2021-10-02 ENCOUNTER — HEALTH MAINTENANCE LETTER (OUTPATIENT)
Age: 41
End: 2021-10-02

## 2021-11-16 ENCOUNTER — IMMUNIZATION (OUTPATIENT)
Dept: NURSING | Facility: CLINIC | Age: 41
End: 2021-11-16
Payer: COMMERCIAL

## 2021-11-16 PROCEDURE — 90471 IMMUNIZATION ADMIN: CPT

## 2021-11-16 PROCEDURE — 90682 RIV4 VACC RECOMBINANT DNA IM: CPT

## 2021-11-16 PROCEDURE — 0004A PR COVID VAC PFIZER DIL RECON 30 MCG/0.3 ML IM: CPT

## 2021-11-16 PROCEDURE — 91300 PR COVID VAC PFIZER DIL RECON 30 MCG/0.3 ML IM: CPT

## 2022-05-14 ENCOUNTER — HEALTH MAINTENANCE LETTER (OUTPATIENT)
Age: 42
End: 2022-05-14

## 2022-09-23 ENCOUNTER — IMMUNIZATION (OUTPATIENT)
Dept: NURSING | Facility: CLINIC | Age: 42
End: 2022-09-23
Payer: COMMERCIAL

## 2022-09-23 PROCEDURE — 91312 COVID-19,PF,PFIZER BOOSTER BIVALENT: CPT

## 2022-09-23 PROCEDURE — 90471 IMMUNIZATION ADMIN: CPT

## 2022-09-23 PROCEDURE — 0124A COVID-19,PF,PFIZER BOOSTER BIVALENT: CPT

## 2022-09-23 PROCEDURE — 90686 IIV4 VACC NO PRSV 0.5 ML IM: CPT

## 2023-06-02 ENCOUNTER — HEALTH MAINTENANCE LETTER (OUTPATIENT)
Age: 43
End: 2023-06-02

## 2023-10-31 ENCOUNTER — LAB REQUISITION (OUTPATIENT)
Dept: LAB | Facility: CLINIC | Age: 43
End: 2023-10-31

## 2023-10-31 DIAGNOSIS — Z13.6 ENCOUNTER FOR SCREENING FOR CARDIOVASCULAR DISORDERS: ICD-10-CM

## 2024-02-17 ENCOUNTER — HEALTH MAINTENANCE LETTER (OUTPATIENT)
Age: 44
End: 2024-02-17

## 2024-07-06 ENCOUNTER — HEALTH MAINTENANCE LETTER (OUTPATIENT)
Age: 44
End: 2024-07-06

## 2024-09-16 ENCOUNTER — TRANSCRIBE ORDERS (OUTPATIENT)
Dept: OTHER | Age: 44
End: 2024-09-16

## 2024-09-16 DIAGNOSIS — U09.9 LONG COVID: Primary | ICD-10-CM

## 2024-09-24 ENCOUNTER — VIRTUAL VISIT (OUTPATIENT)
Dept: PHYSICAL MEDICINE AND REHAB | Facility: CLINIC | Age: 44
End: 2024-09-24
Attending: FAMILY MEDICINE
Payer: COMMERCIAL

## 2024-09-24 DIAGNOSIS — U09.9 POST-COVID CHRONIC CONCENTRATION DEFICIT: Primary | ICD-10-CM

## 2024-09-24 DIAGNOSIS — R41.841 COGNITIVE COMMUNICATION DEFICIT: ICD-10-CM

## 2024-09-24 DIAGNOSIS — R41.840 POST-COVID CHRONIC CONCENTRATION DEFICIT: Primary | ICD-10-CM

## 2024-09-24 DIAGNOSIS — U09.9 POST-COVID CHRONIC DYSPNEA: ICD-10-CM

## 2024-09-24 DIAGNOSIS — R06.09 POST-COVID CHRONIC DYSPNEA: ICD-10-CM

## 2024-09-24 DIAGNOSIS — R05.3 CHRONIC COUGH: ICD-10-CM

## 2024-09-24 DIAGNOSIS — R14.0 ABDOMINAL BLOATING: ICD-10-CM

## 2024-09-24 DIAGNOSIS — G93.32 POST-COVID CHRONIC FATIGUE: ICD-10-CM

## 2024-09-24 DIAGNOSIS — U09.9 POST-COVID CHRONIC FATIGUE: ICD-10-CM

## 2024-09-24 DIAGNOSIS — U09.9 LONG COVID: ICD-10-CM

## 2024-09-24 DIAGNOSIS — J38.7 IRRITABLE LARYNX SYNDROME: ICD-10-CM

## 2024-09-24 RX ORDER — CETIRIZINE HYDROCHLORIDE 10 MG/1
10 TABLET ORAL DAILY
COMMUNITY

## 2024-09-24 RX ORDER — FLUTICASONE PROPIONATE 50 MCG
2 SPRAY, SUSPENSION (ML) NASAL
COMMUNITY
Start: 2024-09-05

## 2024-09-24 SDOH — SOCIAL STABILITY: SOCIAL NETWORK: I HAVE TROUBLE DOING ALL OF THE ACTIVITIES WITH FRIENDS THAT I WANT TO DO: USUALLY

## 2024-09-24 SDOH — SOCIAL STABILITY: SOCIAL NETWORK: I HAVE TROUBLE DOING ALL OF MY USUAL WORK (INCLUDE WORK AT HOME): USUALLY

## 2024-09-24 SDOH — SOCIAL STABILITY: SOCIAL NETWORK

## 2024-09-24 SDOH — SOCIAL STABILITY: SOCIAL NETWORK: PROMIS ABILITY TO PARTICIPATE IN SOCIAL ROLES & ACTIVITIES T-SCORE: 39

## 2024-09-24 SDOH — SOCIAL STABILITY: SOCIAL NETWORK: I HAVE TROUBLE DOING ALL OF MY REGULAR LEISURE ACTIVITIES WITH OTHERS: USUALLY

## 2024-09-24 SDOH — SOCIAL STABILITY: SOCIAL NETWORK: I HAVE TROUBLE DOING ALL OF THE FAMILY ACTIVITIES THAT I WANT TO DO: USUALLY

## 2024-09-24 ASSESSMENT — ANXIETY QUESTIONNAIRES: GAD7 TOTAL SCORE: INCOMPLETE

## 2024-09-24 NOTE — LETTER
9/24/2024       RE: Kristin Page  [address]  [place] [address]     Dear Colleague,    Thank you for referring your patient, Kristin Page, to the [facility] PHYSICAL MEDICINE AND REHABILITATION CLINIC [place] at [facility]. Please see a copy of my visit note below.    Kristin Page is a 44 year old female who presents to be evaluated for a billable video visit.    Video-Visit Details    Video visit Start time:8:03 AM    Type of service:  Video Visit    Video End Time:8:33 AM    Originating Location (pt. Location): Home    Distant Location (provider location):  Off- Site    Platform used for Video Visit: Rollbase (acquired by [employer])    Assessment/ Impression:   1. 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 Discussed energy conservation and provided information on fatigue management.  Also discussed referral to Occupational therapy for the COVID-19 program. Discussed due to weight gain will evaluate thyroid.  Will check for other underlying causes as well.  Discussed starting N-Acetylcysteine 600 mg. Discussed side effect of GI upset.  She is already prescribed LDN and encouraged to start again  - Ferritin; Future  - Iron; Future  - Vitamin B12; Future  - Vitamin D Deficiency; Future  - TSH; Future  - T4 free; Future  - T3 total; Future  - CRP inflammation; Future  - CBC with Platelets & Differential; Future  - Basic metabolic panel; Future  - Occupational Therapy  Referral; Future    2. Irritable larynx syndrome/Chronic cough  Patient with chronic cough since most recent viral infection.  She also had significant vocal clearing during visit. Discussed mechanism behind cough and vocal cord irritation and encouraged to see ENT for evaluation.  Discussed warm water with honey as well.   - Adult ENT  Referral; Future    3. Post-COVID  chronic dyspnea  Patient with shortness of breath with exertion. Discussed this could be deconditioning or cardiac in nature.  Encouraged patient to monitor symptoms and report if she has palpitations or chest tightness with her shortness of breath.    4. Abdominal bloating  Patient does report abdominal bloating which is new after most recent viral infection.  Does report this happened also after COVID infection in 2021.  Discussed possible irritable bowel syndrome and encouraged patient to start working with [facility].  - Other Specialty Referral; Future    5. Long COVID  Discussed COVID and Post COVID with patient.  Educational materials provided and all questions answered. Patient with symptoms not able to be evaluate or discussed today due to time constraint. Will discuss sleep and numbness at next visit in one month.   - Other Specialty Referral; Future      Plan:  I reviewed present knowledge on long-Covid.  Education was provided and question were answered.  Orders/Referrals as above  Will advised patient on test results  I will follow up with Kristin Page in 1 month. I will review progress and consider need for any other therapeutic interventions. If there are any questions and/or concerns she will call the clinic.      On day of encounter time spent in chart review and with patient in consultation, exam, education, coordination of care, review of outside charts/data and documentation:  45 minutes     I have attempted to proof read for major spelling errors and apologize for any minor errors I may have missed.      This note was dictated using voice recognition software. Any grammatical or context distortions are unintentional and inherent to the software.    _____________  Margie Cutler PA-C  [facility] PHYSICAL MEDICINE AND REHABILITATION CLINIC [place]    Subjective   This 44 year old female presents to the [facility] Rehabilitation Medicine  Post-COVID clinic as a new consult to evaluate continuing symptoms after viral infection initially diagnosed 8/23/24.  Kristin Page presented to urgent care on 8/23/24 complaining of  sinus pressure/ congestion, shortness of breath, cough, headache, and fatigue. Treatment was antibiotics for sinus infection. Covid testing was negative.   Patient does have a history of Long COVID after COVID infection 10/2021. Kristin Page experienced complications of post exertional malaise, fatigue.  Continuing symptoms include fatigue, weakness, cough, shortness of breath, headache, difficulty sleeping, numbness, tingling, brain fog, and distractibility.  Patient has fatigue and feels she has more energy in the am. She has no energy after 4 pm. She is taking 1-2 naps on the weekend.   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 Patient notices she is having trouble remembering words or tracking conversations.  Patient does get short of breath with exertion.  She also feels like she has heart racing when does exertion ( stairs, carrying).  Denies any chest tightness when going up stairs. Sleep is disrupted. She has gained 30 lbs in the past year and has noticed abdominal bloating. She was being evaluated at one point for mold by a functional medicine doctor. Past medical history is significant for  Fibromyalgia . The patient was vaccinated against COVID x4, last vaccine 9/2022 .  Previous activity was full time work, Cafe.     History of COVID-19 infection: 10/2021  Date of first symptoms: 10/2021  Diagnosis: antigen  Hospitalization: No  Treatment: symptomatic  Current Symptoms: See subjective  Goals of Care: increase energy, decrease shortness of breath, decrease coughing, improve thinking, and improve quality of life          9/24/2024     7:53 AM   PHQ Assesment Total Score(s)   PHQ-2 Score 2           9/24/2024     7:57 AM   MAGNUS-7 Results   MAGNUS 7 TOTAL SCORE Incomplete         9/24/2024     7:57 AM   PTSD Screen  Score   Have you ever experienced this kind of event? No         9/24/2024     7:57 AM   PROMIS-29   PROMIS Physical Function T-Score 42 (mild dysfunction)   PROMIS Anxiety T-Score 58 (mild)   PROMIS Depression T-Score 41 (within normal limits)   PROMIS Fatigue T-Score 76 (severe)   PROMIS Sleep Disturbance T-Score 56 (mild)   PROMIS Ability to Participate in Social Roles & Activities T-Score 39 (moderate dysfunction)   PROMIS Pain Interference T-Score 61 (moderate)   PROMIS Pain Intensity 3       Past Medical History:   Diagnosis Date     NO ACTIVE PROBLEMS      Pap smear abnormality of anus, abnormal glandular cells     2008       Past Surgical History:   Procedure Laterality Date     no history of surgery         Family History   Problem Relation Age of Onset     Alzheimer Disease Maternal Grandmother      Cerebrovascular Disease Maternal Grandmother      C.A.D. Maternal Grandfather      Cancer - colorectal Paternal Grandfather      Thyroid Disease Sister        Social History     Tobacco Use     Smoking status: Never     Smokeless tobacco: Never   Substance Use Topics     Alcohol use: No     Alcohol/week: 0.0 standard drinks of alcohol     Drug use: No         Current Outpatient Medications:      Omega-3 Fatty Acids (OMEGA-3 FISH OIL PO), Take 1 g by mouth, Disp: , Rfl:      oseltamivir (TAMIFLU) 75 MG capsule, Take 1 capsule (75 mg) by mouth 2 times daily, Disp: 10 capsule, Rfl: 0     PRENATAL VITAMINS PO, Take 1 tablet by mouth daily., Disp: , Rfl:     Review of Systems   Constitutional, HEENT, cardiovascular, pulmonary, GI, , musculoskeletal, neuro, skin, endocrine and psych systems are negative, except as otherwise noted.      Objective    Vitals:  No vitals were obtained today due to virtual visit.    Physical Exam   EYES: Eyes grossly normal to inspection.  No discharge or erythema, or obvious scleral/conjunctival abnormalities.  SKIN: Visible skin clear. No significant rash, abnormal pigmentation or  lesions.  NEURO: Cranial nerves grossly intact.  Mentation and speech appropriate for age.  GENERAL: Healthy, alert and no distress  RESP: No audible wheeze, cough, or visible cyanosis.  No visible retractions or increased work of breathing.    PSYCH: Mentation appears normal, affect normal/bright, judgement and insight intact, normal speech and appearance well-groomed.    Labs: No recent labs    Imaging:    I personally reviewed the following imaging results today and those on care everywhere, if indicated     XR Chest 2 views   IMPRESSION: Negative chest.  Exam End: 08/31/24  2:52 PM       Reviewed imaging from [facility] sites and [facility]     Medical Records Reviewed:    Reviewed consults/documents from  [facility], [facility], and [facility] including  Family Practice, Urgent care        Again, thank you for allowing me to participate in the care of your patient.      Sincerely,    Margie Cutler PA-C

## 2024-09-24 NOTE — NURSING NOTE
Current patient location: [address] [place] [address]    Is the patient currently in the state of [place]? YES    Visit mode:VIDEO    If the visit is dropped, the patient can be reconnected by: TELEPHONE VISIT: Phone number:   Telephone Information:   Mobile [phone]       Will anyone else be joining the visit? NO  (If patient encounters technical issues they should call [phone]    How would you like to obtain your AVS? MyChart    Are changes needed to the allergy or medication list? Pt stated no med changes    Are refills needed on medications prescribed by this physician? NO    Rooming Documentation:  Questionnaire(s) not done per department protocol    Reason for visit: Video Visit (Post covid)    Kisha STALEY

## 2024-09-24 NOTE — PROGRESS NOTES
Kristin Page is a 44 year old female who presents to be evaluated for a billable video visit.    Video-Visit Details    Video visit Start time:8:03 AM    Type of service:  Video Visit    Video End Time:8:33 AM    Originating Location (pt. Location): Home    Distant Location (provider location):  Off- Site    Platform used for Video Visit: [employer]    Assessment/ Impression:   1. 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 Discussed energy conservation and provided information on fatigue management.  Also discussed referral to Occupational therapy for the COVID-19 program. Discussed due to weight gain will evaluate thyroid.  Will check for other underlying causes as well.  Discussed starting N-Acetylcysteine 600 mg. Discussed side effect of GI upset.  She is already prescribed LDN and encouraged to start again  - Ferritin; Future  - Iron; Future  - Vitamin B12; Future  - Vitamin D Deficiency; Future  - TSH; Future  - T4 free; Future  - T3 total; Future  - CRP inflammation; Future  - CBC with Platelets & Differential; Future  - Basic metabolic panel; Future  - Occupational Therapy  Referral; Future    2. Irritable larynx syndrome/Chronic cough  Patient with chronic cough since most recent viral infection.  She also had significant vocal clearing during visit. Discussed mechanism behind cough and vocal cord irritation and encouraged to see ENT for evaluation.  Discussed warm water with honey as well.   - Adult ENT  Referral; Future    3. Post-COVID chronic dyspnea  Patient with shortness of breath with exertion. Discussed this could be deconditioning or cardiac in nature.  Encouraged patient to monitor symptoms and report if she has palpitations or chest tightness with her shortness of breath.    4. Abdominal bloating  Patient does report abdominal bloating which is new after most  recent viral infection.  Does report this happened also after COVID infection in 2021.  Discussed possible irritable bowel syndrome and encouraged patient to start working with [facility].  - Other Specialty Referral; Future    5. Long COVID  Discussed COVID and Post COVID with patient.  Educational materials provided and all questions answered. Patient with symptoms not able to be evaluate or discussed today due to time constraint. Will discuss sleep and numbness at next visit in one month.   - Other Specialty Referral; Future      Plan:  I reviewed present knowledge on long-Covid.  Education was provided and question were answered.  Orders/Referrals as above  Will advised patient on test results  I will follow up with Kristin Page in 1 month. I will review progress and consider need for any other therapeutic interventions. If there are any questions and/or concerns she will call the clinic.      On day of encounter time spent in chart review and with patient in consultation, exam, education, coordination of care, review of outside charts/data and documentation:  45 minutes     I have attempted to proof read for major spelling errors and apologize for any minor errors I may have missed.      This note was dictated using voice recognition software. Any grammatical or context distortions are unintentional and inherent to the software.    _____________  Margie Cutler PA-C  [facility] PHYSICAL MEDICINE AND REHABILITATION CLINIC [facility]   This 44 year old female presents to the [facility] Rehabilitation Medicine Post-COVID clinic as a new consult to evaluate continuing symptoms after viral infection initially diagnosed 8/23/24.  Kristin Page presented to urgent care on 8/23/24 complaining of  sinus pressure/ congestion, shortness of breath, cough, headache, and fatigue. Treatment was antibiotics for sinus infection. Covid testing was negative.    Patient does have a history of Long COVID after COVID infection 10/2021. Kristin Page experienced complications of post exertional malaise, fatigue.  Continuing symptoms include fatigue, weakness, cough, shortness of breath, headache, difficulty sleeping, numbness, tingling, brain fog, and distractibility.  Patient has fatigue and feels she has more energy in the am. She has no energy after 4 pm. She is taking 1-2 naps on the weekend.   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 Patient notices she is having trouble remembering words or tracking conversations.  Patient does get short of breath with exertion.  She also feels like she has heart racing when does exertion ( stairs, carrying).  Denies any chest tightness when going up stairs. Sleep is disrupted. She has gained 30 lbs in the past year and has noticed abdominal bloating. She was being evaluated at one point for mold by a functional medicine doctor. Past medical history is significant for  Fibromyalgia . The patient was vaccinated against COVID x4, last vaccine 9/2022 .  Previous activity was full time work, [employer].     History of COVID-19 infection: 10/2021  Date of first symptoms: 10/2021  Diagnosis: antigen  Hospitalization: No  Treatment: symptomatic  Current Symptoms: See subjective  Goals of Care: increase energy, decrease shortness of breath, decrease coughing, improve thinking, and improve quality of life          9/24/2024     7:53 AM   PHQ Assesment Total Score(s)   PHQ-2 Score 2           9/24/2024     7:57 AM   MAGNUS-7 Results   MAGNUS 7 TOTAL SCORE Incomplete         9/24/2024     7:57 AM   PTSD Screen Score   Have you ever experienced this kind of event? No         9/24/2024     7:57 AM   PROMIS-29   PROMIS Physical Function T-Score 42 (mild dysfunction)   PROMIS Anxiety T-Score 58 (mild)   PROMIS Depression T-Score 41 (within normal limits)   PROMIS Fatigue T-Score 76 (severe)   PROMIS Sleep Disturbance T-Score 56 (mild)   PROMIS  Ability to Participate in Social Roles & Activities T-Score 39 (moderate dysfunction)   PROMIS Pain Interference T-Score 61 (moderate)   PROMIS Pain Intensity 3       Past Medical History:   Diagnosis Date    NO ACTIVE PROBLEMS     Pap smear abnormality of anus, abnormal glandular cells     2008       Past Surgical History:   Procedure Laterality Date    no history of surgery         Family History   Problem Relation Age of Onset    Alzheimer Disease Maternal Grandmother     Cerebrovascular Disease Maternal Grandmother     C.A.D. Maternal Grandfather     Cancer - colorectal Paternal Grandfather     Thyroid Disease Sister        Social History     Tobacco Use    Smoking status: Never    Smokeless tobacco: Never   Substance Use Topics    Alcohol use: No     Alcohol/week: 0.0 standard drinks of alcohol    Drug use: No         Current Outpatient Medications:     Omega-3 Fatty Acids (OMEGA-3 FISH OIL PO), Take 1 g by mouth, Disp: , Rfl:     oseltamivir (TAMIFLU) 75 MG capsule, Take 1 capsule (75 mg) by mouth 2 times daily, Disp: 10 capsule, Rfl: 0    PRENATAL VITAMINS PO, Take 1 tablet by mouth daily., Disp: , Rfl:     Review of Systems   Constitutional, HEENT, cardiovascular, pulmonary, GI, , musculoskeletal, neuro, skin, endocrine and psych systems are negative, except as otherwise noted.      Objective    Vitals:  No vitals were obtained today due to virtual visit.    Physical Exam   EYES: Eyes grossly normal to inspection.  No discharge or erythema, or obvious scleral/conjunctival abnormalities.  SKIN: Visible skin clear. No significant rash, abnormal pigmentation or lesions.  NEURO: Cranial nerves grossly intact.  Mentation and speech appropriate for age.  GENERAL: Healthy, alert and no distress  RESP: No audible wheeze, cough, or visible cyanosis.  No visible retractions or increased work of breathing.    PSYCH: Mentation appears normal, affect normal/bright, judgement and insight intact, normal speech and  appearance well-groomed.    Labs: No recent labs    Imaging:    I personally reviewed the following imaging results today and those on care everywhere, if indicated     XR Chest 2 views   IMPRESSION: Negative chest.  Exam End: 08/31/24  2:52 PM       Reviewed imaging from [facility] sites and [employer]     Medical Records Reviewed:    Reviewed consults/documents from  [facility], [facility], and [facility] including  Family Practice, Urgent care

## 2024-09-24 NOTE — PATIENT INSTRUCTIONS
Post COVID Self Care Suggestions:     Fatigue Management:       https://www.archives-pmr.org/action/showPdf?ico=-5524%2819%4154038-6       Self Care:      [email or web address].[email or web address]/[email or web address]-care/self-[email or web address]/  Recovery World Health Organization:    https://apps.who.int/iris/[employer]/handle/83481/048612/ZNV-QJCG-4951-604-01226-32839-eng.pdf  Breathing exercises:    https://www.[facility]/health/conditions-and-diseases/coronavirus/coronavirus-recovery-breathing-exercises      Assessment of sense of smell and taste    Smell training:  Flowery - Tatiana  Fruity - Lemon  Spicy - Cloves  Resinous - Eucalyptus      1 - Pour a few droplets of one of the oils on to a cotton pad or ball  2 - Do not try to sniff the pad immediately; leave it for a few minutes for the fragrance to develop  3 - Hold the first stick/pad up to your nose, about an inch away.  The order in which you test the oils does not matter  4 - Relax and try to inhale naturally through the nose - sniffing too quickly and deeply is likely to result in you not being able to detect anything  5 - Try this a couple more times, then rest for five minutes  6 - Move on to the next oil and repeat as above.    After three months switch to a new set of odors: menthol, thyme, tangerine, and pete, training with them twice daily.    After another three months, switch to a third new set of odors: green tea, bergamot, rosemary, and khushbu, again training with them twice daily.    Studies:   [place] Paxlovid [place]  [email or web address]://[email or web address]/cii/research/paxlc-study/?[email or web address]    Cognitive Post-COVID study  z.[facility]/covid-ema    Supplements:  Fatigue- COQ10 100mg 3x day  ( side effects GI)  Brain fog-N-acetylcysteine 600 mg daily (side effects GI)

## 2024-09-25 ENCOUNTER — DOCUMENTATION ONLY (OUTPATIENT)
Dept: PHYSICAL MEDICINE AND REHAB | Facility: CLINIC | Age: 44
End: 2024-09-25
Payer: COMMERCIAL

## 2024-09-25 NOTE — PROGRESS NOTES
Orders faxed to [facility] at [phone]  and other specialty to Liseth benitez [phone]

## 2024-10-03 ENCOUNTER — LAB (OUTPATIENT)
Dept: LAB | Facility: CLINIC | Age: 44
End: 2024-10-03
Payer: COMMERCIAL

## 2024-10-03 DIAGNOSIS — U09.9 POST-COVID CHRONIC CONCENTRATION DEFICIT: ICD-10-CM

## 2024-10-03 DIAGNOSIS — G93.32 POST-COVID CHRONIC FATIGUE: ICD-10-CM

## 2024-10-03 DIAGNOSIS — U09.9 POST-COVID CHRONIC FATIGUE: ICD-10-CM

## 2024-10-03 DIAGNOSIS — R41.840 POST-COVID CHRONIC CONCENTRATION DEFICIT: ICD-10-CM

## 2024-10-03 DIAGNOSIS — R41.841 COGNITIVE COMMUNICATION DEFICIT: ICD-10-CM

## 2024-10-03 LAB
ANION GAP SERPL CALCULATED.3IONS-SCNC: 9 MMOL/L (ref 7–15)
BASOPHILS # BLD AUTO: 0 10E3/UL (ref 0–0.2)
BASOPHILS NFR BLD AUTO: 0 %
BUN SERPL-MCNC: 8.6 MG/DL (ref 6–20)
CALCIUM SERPL-MCNC: 9.7 MG/DL (ref 8.8–10.4)
CHLORIDE SERPL-SCNC: 103 MMOL/L (ref 98–107)
CREAT SERPL-MCNC: 0.91 MG/DL (ref 0.51–0.95)
CRP SERPL-MCNC: <3 MG/L
EGFRCR SERPLBLD CKD-EPI 2021: 79 ML/MIN/1.73M2
EOSINOPHIL # BLD AUTO: 0.1 10E3/UL (ref 0–0.7)
EOSINOPHIL NFR BLD AUTO: 2 %
ERYTHROCYTE [DISTWIDTH] IN BLOOD BY AUTOMATED COUNT: 11.9 % (ref 10–15)
FERRITIN SERPL-MCNC: 42 NG/ML (ref 6–175)
GLUCOSE SERPL-MCNC: 106 MG/DL (ref 70–99)
HCO3 SERPL-SCNC: 26 MMOL/L (ref 22–29)
HCT VFR BLD AUTO: 40.9 % (ref 35–47)
HGB BLD-MCNC: 14.3 G/DL (ref 11.7–15.7)
IMM GRANULOCYTES # BLD: 0 10E3/UL
IMM GRANULOCYTES NFR BLD: 0 %
IRON SERPL-MCNC: 97 UG/DL (ref 37–145)
LYMPHOCYTES # BLD AUTO: 2 10E3/UL (ref 0.8–5.3)
LYMPHOCYTES NFR BLD AUTO: 40 %
MCH RBC QN AUTO: 32 PG (ref 26.5–33)
MCHC RBC AUTO-ENTMCNC: 35 G/DL (ref 31.5–36.5)
MCV RBC AUTO: 92 FL (ref 78–100)
MONOCYTES # BLD AUTO: 0.3 10E3/UL (ref 0–1.3)
MONOCYTES NFR BLD AUTO: 5 %
NEUTROPHILS # BLD AUTO: 2.6 10E3/UL (ref 1.6–8.3)
NEUTROPHILS NFR BLD AUTO: 52 %
NRBC # BLD AUTO: 0 10E3/UL
NRBC BLD AUTO-RTO: 0 /100
PLATELET # BLD AUTO: 275 10E3/UL (ref 150–450)
POTASSIUM SERPL-SCNC: 4.1 MMOL/L (ref 3.4–5.3)
RBC # BLD AUTO: 4.47 10E6/UL (ref 3.8–5.2)
SODIUM SERPL-SCNC: 138 MMOL/L (ref 135–145)
T3 SERPL-MCNC: 97 NG/DL (ref 85–202)
T4 FREE SERPL-MCNC: 1.12 NG/DL (ref 0.9–1.7)
TSH SERPL DL<=0.005 MIU/L-ACNC: 1.61 UIU/ML (ref 0.3–4.2)
VIT B12 SERPL-MCNC: 638 PG/ML (ref 232–1245)
VIT D+METAB SERPL-MCNC: 32 NG/ML (ref 20–50)
WBC # BLD AUTO: 4.9 10E3/UL (ref 4–11)

## 2024-10-03 PROCEDURE — 84480 ASSAY TRIIODOTHYRONINE (T3): CPT

## 2024-10-03 PROCEDURE — 84443 ASSAY THYROID STIM HORMONE: CPT

## 2024-10-03 PROCEDURE — 36415 COLL VENOUS BLD VENIPUNCTURE: CPT

## 2024-10-03 PROCEDURE — 82728 ASSAY OF FERRITIN: CPT

## 2024-10-03 PROCEDURE — 86140 C-REACTIVE PROTEIN: CPT

## 2024-10-03 PROCEDURE — 84439 ASSAY OF FREE THYROXINE: CPT

## 2024-10-03 PROCEDURE — 80048 BASIC METABOLIC PNL TOTAL CA: CPT

## 2024-10-03 PROCEDURE — 82306 VITAMIN D 25 HYDROXY: CPT

## 2024-10-03 PROCEDURE — 82607 VITAMIN B-12: CPT

## 2024-10-03 PROCEDURE — 83540 ASSAY OF IRON: CPT

## 2024-10-03 PROCEDURE — 85004 AUTOMATED DIFF WBC COUNT: CPT

## 2024-10-21 ENCOUNTER — VIRTUAL VISIT (OUTPATIENT)
Dept: PHYSICAL MEDICINE AND REHAB | Facility: CLINIC | Age: 44
End: 2024-10-21
Payer: COMMERCIAL

## 2024-10-21 VITALS — WEIGHT: 175 LBS | HEIGHT: 68 IN | BODY MASS INDEX: 26.52 KG/M2

## 2024-10-21 DIAGNOSIS — U09.9 POST-COVID CHRONIC DYSPNEA: ICD-10-CM

## 2024-10-21 DIAGNOSIS — R20.0 NUMBNESS AND TINGLING IN BOTH HANDS: ICD-10-CM

## 2024-10-21 DIAGNOSIS — R14.0 ABDOMINAL BLOATING: ICD-10-CM

## 2024-10-21 DIAGNOSIS — U09.9 POST-COVID CHRONIC FATIGUE: ICD-10-CM

## 2024-10-21 DIAGNOSIS — R20.2 NUMBNESS AND TINGLING IN BOTH HANDS: ICD-10-CM

## 2024-10-21 DIAGNOSIS — R05.3 CHRONIC COUGH: ICD-10-CM

## 2024-10-21 DIAGNOSIS — U09.9 POST-COVID CHRONIC CONCENTRATION DEFICIT: Primary | ICD-10-CM

## 2024-10-21 DIAGNOSIS — R06.09 POST-COVID CHRONIC DYSPNEA: ICD-10-CM

## 2024-10-21 DIAGNOSIS — R41.840 POST-COVID CHRONIC CONCENTRATION DEFICIT: Primary | ICD-10-CM

## 2024-10-21 DIAGNOSIS — U09.9 LONG COVID: ICD-10-CM

## 2024-10-21 DIAGNOSIS — G93.32 POST-COVID CHRONIC FATIGUE: ICD-10-CM

## 2024-10-21 DIAGNOSIS — J38.7 IRRITABLE LARYNX SYNDROME: ICD-10-CM

## 2024-10-21 DIAGNOSIS — R41.841 COGNITIVE COMMUNICATION DEFICIT: ICD-10-CM

## 2024-10-21 PROCEDURE — 99214 OFFICE O/P EST MOD 30 MIN: CPT | Mod: 95 | Performed by: PHYSICIAN ASSISTANT

## 2024-10-21 SDOH — SOCIAL STABILITY: SOCIAL NETWORK: I HAVE TROUBLE DOING ALL OF MY REGULAR LEISURE ACTIVITIES WITH OTHERS: USUALLY

## 2024-10-21 SDOH — SOCIAL STABILITY: SOCIAL NETWORK: I HAVE TROUBLE DOING ALL OF THE FAMILY ACTIVITIES THAT I WANT TO DO: USUALLY

## 2024-10-21 SDOH — SOCIAL STABILITY: SOCIAL NETWORK: I HAVE TROUBLE DOING ALL OF THE ACTIVITIES WITH FRIENDS THAT I WANT TO DO: USUALLY

## 2024-10-21 SDOH — SOCIAL STABILITY: SOCIAL NETWORK: I HAVE TROUBLE DOING ALL OF MY USUAL WORK (INCLUDE WORK AT HOME): USUALLY

## 2024-10-21 SDOH — SOCIAL STABILITY: SOCIAL NETWORK: PROMIS ABILITY TO PARTICIPATE IN SOCIAL ROLES & ACTIVITIES T-SCORE: 39

## 2024-10-21 SDOH — SOCIAL STABILITY: SOCIAL NETWORK

## 2024-10-21 ASSESSMENT — PATIENT HEALTH QUESTIONNAIRE - PHQ9
10. IF YOU CHECKED OFF ANY PROBLEMS, HOW DIFFICULT HAVE THESE PROBLEMS MADE IT FOR YOU TO DO YOUR WORK, TAKE CARE OF THINGS AT HOME, OR GET ALONG WITH OTHER PEOPLE: VERY DIFFICULT
SUM OF ALL RESPONSES TO PHQ QUESTIONS 1-9: 7
SUM OF ALL RESPONSES TO PHQ QUESTIONS 1-9: 7

## 2024-10-21 ASSESSMENT — ANXIETY QUESTIONNAIRES
IF YOU CHECKED OFF ANY PROBLEMS ON THIS QUESTIONNAIRE, HOW DIFFICULT HAVE THESE PROBLEMS MADE IT FOR YOU TO DO YOUR WORK, TAKE CARE OF THINGS AT HOME, OR GET ALONG WITH OTHER PEOPLE: SOMEWHAT DIFFICULT
GAD7 TOTAL SCORE: 6
7. FEELING AFRAID AS IF SOMETHING AWFUL MIGHT HAPPEN: SEVERAL DAYS
1. FEELING NERVOUS, ANXIOUS, OR ON EDGE: MORE THAN HALF THE DAYS
GAD7 TOTAL SCORE: 6
5. BEING SO RESTLESS THAT IT IS HARD TO SIT STILL: NOT AT ALL
GAD7 TOTAL SCORE: 6
6. BECOMING EASILY ANNOYED OR IRRITABLE: NOT AT ALL
8. IF YOU CHECKED OFF ANY PROBLEMS, HOW DIFFICULT HAVE THESE MADE IT FOR YOU TO DO YOUR WORK, TAKE CARE OF THINGS AT HOME, OR GET ALONG WITH OTHER PEOPLE?: SOMEWHAT DIFFICULT
2. NOT BEING ABLE TO STOP OR CONTROL WORRYING: SEVERAL DAYS
7. FEELING AFRAID AS IF SOMETHING AWFUL MIGHT HAPPEN: SEVERAL DAYS
3. WORRYING TOO MUCH ABOUT DIFFERENT THINGS: SEVERAL DAYS
4. TROUBLE RELAXING: SEVERAL DAYS

## 2024-10-21 ASSESSMENT — PAIN SCALES - GENERAL: PAINLEVEL: NO PAIN (0)

## 2024-10-21 NOTE — PATIENT INSTRUCTIONS
To Do:   Start albuterol inhaler again ( see if this helps chest tightness)  Breathing exercises  FOADMAP for abdominal bloating  Spine referral for numbness  Discussed Cymbalta  for pain/numbness  Trial alternative for sleep - Unisom  RTC 6 weeks    Post COVID Self Care Suggestions:     Fatigue Management:       https://www.archives-pmr.org/action/showPdf?jmi=-9306%2819%9113010-6       Self Care:      [email or web address]://[email or web address].[email or web address]/pain-care/self-care/  Recovery World Health Organization:    https://apps.who.int/iris/[employer]/handle/75934/077913/DIU-NFWS-0766-301-55794-70062-eng.pdf  Breathing exercises:    https://www.[facility].org/health/conditions-and-diseases/coronavirus/coronavirus-recovery-breathing-exercises      Assessment of sense of smell and taste    Smell training:  Flowery - Tatiana  Fruity - Lemon  Spicy - Cloves  Resinous - Eucalyptus      1 - Pour a few droplets of one of the oils on to a cotton pad or ball  2 - Do not try to sniff the pad immediately; leave it for a few minutes for the fragrance to develop  3 - Hold the first stick/pad up to your nose, about an inch away.  The order in which you test the oils does not matter  4 - Relax and try to inhale naturally through the nose - sniffing too quickly and deeply is likely to result in you not being able to detect anything  5 - Try this a couple more times, then rest for five minutes  6 - Move on to the next oil and repeat as above.    After three months switch to a new set of odors: menthol, thyme, tangerine, and pete, training with them twice daily.    After another three months, switch to a third new set of odors: green tea, bergamot, rosemary, and khushbu, again training with them twice daily.    Studies:   [place] Paxlovid [place]  https://medicine.[place].[facility]/cii/research/paxlc-study/?[email or web address]    Cognitive Post-COVID study  z.[facility]/covid-ema    Supplements:  Fatigue- COQ10 100mg 3x day  ( side effects GI)  Brain  fog-N-acetylcysteine 600 mg daily (side effects GI)

## 2024-10-21 NOTE — NURSING NOTE
Current patient location: [address] [place] [address]    Is the patient currently in the state of [place]? YES    Visit mode:VIDEO    If the visit is dropped, the patient can be reconnected by: VIDEO VISIT: Text to cell phone:   Telephone Information:   Mobile [phone]       Will anyone else be joining the visit? NO  (If patient encounters technical issues they should call [phone]    Are changes needed to the allergy or medication list? Yes Pt reports she is taking magnesium, naltrexone, and methyl folate, and Pt stated no changes to allergies    Are refills needed on medications prescribed by this physician? NO    Rooming Documentation:  Not applicable    Reason for visit: LITO ALVAREZF

## 2024-10-21 NOTE — LETTER
10/21/2024       RE: Kristin Page  [address]  [place] [address]     Dear Colleague,    Thank you for referring your patient, Kristin aPge, to the [facility] PHYSICAL MEDICINE AND REHABILITATION CLINIC [place] at [facility]. Please see a copy of my visit note below.    Kristin Page is a 44 year old female who presents to be evaluated for a billable video visit.    Video-Visit Details    Video visit Start time:7:17 AM    Type of service:  Video Visit    Video End Time: 7:42 AM    Originating Location (pt. Location): Home    Distant Location (provider location):  Off- Site    Platform used for Video Visit: KeVita    Assessment/Impression   1. 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 Discussed energy conservation and provided information on fatigue management.  Also discussed referral to Occupational therapy for the COVID-19 program. Discussed again starting N-Acetylcysteine 600 mg. Discussed side effect of GI upset.  She is already prescribed LDN and recently started this but will need prescription as previous provider no longer sees patient.   Encouraged to continue replacing iron in her diet.   - COMPOUNDED NON-CONTROLLED SUBSTANCE (CMPD RX) - PHARMACY TO MIX COMPOUNDED MEDICATION; Take 1 mg tablet  of Low dose naltrexone daily.  Dispense: 30 tablet; Refill: 1    2. Irritable larynx syndrome/Chronic cough  Patient with significant improvement in her chronic cough after working with [facility]. Encouraged to follow up and get CT scan of her sinuses.      3. Post-COVID chronic dyspnea  Patient with shortness of breath with exertion. Discussed this could be deconditioning or cardiac in nature.  Encouraged patient to monitor symptoms and report if she has palpitations or chest tightness with her shortness of breath. Encouraged  "to start breathing exercises provided and also restart inhaler for symptoms.  Will hold on starting ICS due to \"manic\" symptoms with oral prednisone.     4. Abdominal bloating  Patient does report abdominal bloating which is new after most recent viral infection.  Does report this happened also after COVID infection in 2021.  Discussed possible irritable bowel syndrome and encouraged patient keep appointment with Liseth Garcia in February 2024.    5. Numbness and tingling in both hands  Patient has numbness in both hands specifically her III,IV and V finger with radiation to her elbow. She also complains of neck pain and discussed working with a medical spine provider to evaluate.   - Spine  Referral; Future     6. Long COVID  Discussed COVID and Post COVID with patient.  Educational materials provided and all questions answered. Patient with symptoms not able to be evaluate or discussed today due to time constraint. Will discuss sleep and numbness at next visit in one month.   - Other Specialty Referral; Future    Plan:  I reviewed present knowledge on long-Covid.  Education was provided and question were answered.  Orders/Referrals as above  I will advised patient on test results  I will follow up with Kristin Page in 6 weeks. I will review progress and consider need for any other therapeutic interventions. If there are any questions and/or concerns she will call the clinic.      On day of encounter time spent in chart review and with patient in consultation, exam, education,coordination of care,  review of outside charts/documentation and documentation:  30 minutes     I have attempted to proof read for major spelling errors and apologize for any minor errors I may have missed.     This note was dictated using voice recognition software. Any grammatical or context distortions are unintentional and inherent to the software.  _________________________________  Margie Cutler PA-C  [facility] " PHYSICAL MEDICINE AND REHABILITATION CLINIC [facility]   Patient returns for a follow up. Briefly, This 44 year old female presents to the [facility] Rehabilitation Medicine Post-COVID clinic as a new consult on 9/24/24 to evaluate continuing symptoms after viral infection initially diagnosed 8/23/24.  Kristin Page presented to urgent care on 8/23/24 complaining of  sinus pressure/ congestion, shortness of breath, cough, headache, and fatigue. Treatment was antibiotics for sinus infection. Covid testing was negative.   Patient does have a history of Long COVID after COVID infection 10/2021. Kristin Page experienced complications of post exertional malaise, fatigue.  Continuing symptoms include fatigue, weakness, cough, shortness of breath, headache, difficulty sleeping, numbness, tingling, brain fog, and distractibility.     Patient still has a cough in the am but its improved.   She saw [facility] and was placed on antibiotics and prednisone. She did have increase anxiety with prednisone.   She is having a CT later this week.   She has not been having chest pain but does have chest tightness.  She is less short of breath with activity.  Patients fatigue and concentration is worse due to prednisone causing insomnia. She did start LDN last night.  Abdominal bloating is the same.  She has more numbness and tingling in both hands.  It occurs when she is laying down.  This stops at her elbow, this is mostly on the outsides.  Patient has neck pain.  Patient prior to the prednisone was having sleep issues. She is having more trouble falling asleep.  She does wake up at 2-3 am.  Melatonin will help her fall asleep.     Current concerns: Health Concerns      10/21/2024     7:06 AM   PHQ Assesment Total Score(s)   PHQ-9 Score 7           10/21/2024     7:05 AM   MAGNUS-7 Results   MAGNUS 7 TOTAL SCORE 6 (mild anxiety)   MAGNUS-7 Total Score 6         10/21/2024     7:04 AM   PTSD Screen Score    Have you ever experienced this kind of event? Yes   PTSD Screen (Score of 3 or more suggests positive screen) 3         10/21/2024     7:04 AM   PROMIS-29   PROMIS Physical Function T-Score 40 (mild dysfunction)   PROMIS Anxiety T-Score 56 (mild)   PROMIS Depression T-Score 49 (within normal limits)   PROMIS Fatigue T-Score 65 (moderate)   PROMIS Sleep Disturbance T-Score 62 (moderate)   PROMIS Ability to Participate in Social Roles & Activities T-Score 39 (moderate dysfunction)   PROMIS Pain Interference T-Score 56 (mild)   PROMIS Pain Intensity 3     Past Medical History:   Diagnosis Date     NO ACTIVE PROBLEMS      Pap smear abnormality of anus, abnormal glandular cells     2008       Past Surgical History:   Procedure Laterality Date     no history of surgery         Family History   Problem Relation Age of Onset     Alzheimer Disease Maternal Grandmother      Cerebrovascular Disease Maternal Grandmother      C.A.D. Maternal Grandfather      Cancer - colorectal Paternal Grandfather      Thyroid Disease Sister        Social History     Tobacco Use     Smoking status: Never     Smokeless tobacco: Never   Substance Use Topics     Alcohol use: No     Alcohol/week: 0.0 standard drinks of alcohol     Drug use: No         Current Outpatient Medications:      cetirizine (ZYRTEC) 10 MG tablet, Take 10 mg by mouth daily., Disp: , Rfl:      Omega-3 Fatty Acids (OMEGA-3 FISH OIL PO), Take 1 g by mouth, Disp: , Rfl:      fluticasone (FLONASE) 50 MCG/ACT nasal spray, Spray 2 sprays into both nostrils., Disp: , Rfl:      oseltamivir (TAMIFLU) 75 MG capsule, Take 1 capsule (75 mg) by mouth 2 times daily (Patient not taking: Reported on 9/24/2024), Disp: 10 capsule, Rfl: 0     PRENATAL VITAMINS PO, Take 1 tablet by mouth daily. (Patient not taking: Reported on 9/24/2024), Disp: , Rfl:     Review of Systems   Constitutional, HEENT, cardiovascular, pulmonary, GI, , musculoskeletal, neuro, skin, endocrine and psych systems are  negative, except as otherwise noted.      Objective         Vitals:  No vitals were obtained today due to virtual visit.    Physical Exam   EYES: Eyes grossly normal to inspection.  No discharge or erythema, or obvious scleral/conjunctival abnormalities.  SKIN: Visible skin clear. No significant rash, abnormal pigmentation or lesions.  NEURO: Cranial nerves grossly intact.  Mentation and speech appropriate for age.  GENERAL: Healthy, alert and no distress  RESP: No audible wheeze, cough, or visible cyanosis.  No visible retractions or increased work of breathing.    PSYCH: Mentation appears normal, affect normal/bright, judgement and insight intact, normal speech and appearance well-groomed.    Labs :  Lab on 10/03/2024   Component Date Value Ref Range Status     Ferritin 10/03/2024 42  6 - 175 ng/mL Final     Iron 10/03/2024 97  37 - 145 ug/dL Final     Vitamin B12 10/03/2024 638  232 - 1,245 pg/mL Final     Vitamin D, Total (25-Hydroxy) 10/03/2024 32  20 - 50 ng/mL Final    optimum levels     TSH 10/03/2024 1.61  0.30 - 4.20 uIU/mL Final     Free T4 10/03/2024 1.12  0.90 - 1.70 ng/dL Final     T3 Total 10/03/2024 97  85 - 202 ng/dL Final     CRP Inflammation 10/03/2024 <3.00  <5.00 mg/L Final     Sodium 10/03/2024 138  135 - 145 mmol/L Final     Potassium 10/03/2024 4.1  3.4 - 5.3 mmol/L Final     Chloride 10/03/2024 103  98 - 107 mmol/L Final     Carbon Dioxide (CO2) 10/03/2024 26  22 - 29 mmol/L Final     Anion Gap 10/03/2024 9  7 - 15 mmol/L Final     Urea Nitrogen 10/03/2024 8.6  6.0 - 20.0 mg/dL Final     Creatinine 10/03/2024 0.91  0.51 - 0.95 mg/dL Final     GFR Estimate 10/03/2024 79  >60 mL/min/1.73m2 Final    eGFR calculated using 2021 CKD-EPI equation.     Calcium 10/03/2024 9.7  8.8 - 10.4 mg/dL Final    Reference intervals for this test were updated on 7/16/2024 to reflect our healthy population more accurately. There may be differences in the flagging of prior results with similar values performed  with this method. Those prior results can be interpreted in the context of the updated reference intervals.     Glucose 10/03/2024 106 (H)  70 - 99 mg/dL Final     WBC Count 10/03/2024 4.9  4.0 - 11.0 10e3/uL Final     RBC Count 10/03/2024 4.47  3.80 - 5.20 10e6/uL Final     Hemoglobin 10/03/2024 14.3  11.7 - 15.7 g/dL Final     Hematocrit 10/03/2024 40.9  35.0 - 47.0 % Final     MCV 10/03/2024 92  78 - 100 fL Final     MCH 10/03/2024 32.0  26.5 - 33.0 pg Final     MCHC 10/03/2024 35.0  31.5 - 36.5 g/dL Final     RDW 10/03/2024 11.9  10.0 - 15.0 % Final     Platelet Count 10/03/2024 275  150 - 450 10e3/uL Final     % Neutrophils 10/03/2024 52  % Final     % Lymphocytes 10/03/2024 40  % Final     % Monocytes 10/03/2024 5  % Final     % Eosinophils 10/03/2024 2  % Final     % Basophils 10/03/2024 0  % Final     % Immature Granulocytes 10/03/2024 0  % Final     NRBCs per 100 WBC 10/03/2024 0  <1 /100 Final     Absolute Neutrophils 10/03/2024 2.6  1.6 - 8.3 10e3/uL Final     Absolute Lymphocytes 10/03/2024 2.0  0.8 - 5.3 10e3/uL Final     Absolute Monocytes 10/03/2024 0.3  0.0 - 1.3 10e3/uL Final     Absolute Eosinophils 10/03/2024 0.1  0.0 - 0.7 10e3/uL Final     Absolute Basophils 10/03/2024 0.0  0.0 - 0.2 10e3/uL Final     Absolute Immature Granulocytes 10/03/2024 0.0  <=0.4 10e3/uL Final     Absolute NRBCs 10/03/2024 0.0  10e3/uL Final         Imaging:    I personally reviewed the following imaging results today and those on care everywhere, if indicated     Nothing new to review    Reviewed imaging from [facility] sites and [facility]     Medical Records Reviewed:    Reviewed consults/documents from [facility] and [facility] including : Lab.  Outside ENT not available.       Again, thank you for allowing me to participate in the care of your patient.      Sincerely,    Margie Cutler PA-C

## 2024-10-21 NOTE — PROGRESS NOTES
"Kristin Page is a 44 year old female who presents to be evaluated for a billable video visit.    Video-Visit Details    Video visit Start time:7:17 AM    Type of service:  Video Visit    Video End Time: 7:42 AM    Originating Location (pt. Location): Home    Distant Location (provider location):  Off- Site    Platform used for Video Visit: Keerthi    Assessment/Impression   1. 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 Discussed energy conservation and provided information on fatigue management.  Also discussed referral to Occupational therapy for the COVID-19 program. Discussed again starting N-Acetylcysteine 600 mg. Discussed side effect of GI upset.  She is already prescribed LDN and recently started this but will need prescription as previous provider no longer sees patient.   Encouraged to continue replacing iron in her diet.   - COMPOUNDED NON-CONTROLLED SUBSTANCE (CMPD RX) - PHARMACY TO MIX COMPOUNDED MEDICATION; Take 1 mg tablet  of Low dose naltrexone daily.  Dispense: 30 tablet; Refill: 1    2. Irritable larynx syndrome/Chronic cough  Patient with significant improvement in her chronic cough after working with [facility]. Encouraged to follow up and get CT scan of her sinuses.      3. Post-COVID chronic dyspnea  Patient with shortness of breath with exertion. Discussed this could be deconditioning or cardiac in nature.  Encouraged patient to monitor symptoms and report if she has palpitations or chest tightness with her shortness of breath. Encouraged to start breathing exercises provided and also restart inhaler for symptoms.  Will hold on starting ICS due to \"manic\" symptoms with oral prednisone.     4. Abdominal bloating  Patient does report abdominal bloating which is new after most recent viral infection.  Does report this happened also after COVID infection in 2021.  Discussed " possible irritable bowel syndrome and encouraged patient keep appointment with Liseth Garcia in February 2024.    5. Numbness and tingling in both hands  Patient has numbness in both hands specifically her III,IV and V finger with radiation to her elbow. She also complains of neck pain and discussed working with a medical spine provider to evaluate.   - Spine  Referral; Future     6. Long COVID  Discussed COVID and Post COVID with patient.  Educational materials provided and all questions answered. Patient with symptoms not able to be evaluate or discussed today due to time constraint. Will discuss sleep and numbness at next visit in one month.   - Other Specialty Referral; Future    Plan:  I reviewed present knowledge on long-Covid.  Education was provided and question were answered.  Orders/Referrals as above  I will advised patient on test results  I will follow up with Kristin Page in 6 weeks. I will review progress and consider need for any other therapeutic interventions. If there are any questions and/or concerns she will call the clinic.      On day of encounter time spent in chart review and with patient in consultation, exam, education,coordination of care,  review of outside charts/documentation and documentation:  30 minutes     I have attempted to proof read for major spelling errors and apologize for any minor errors I may have missed.     This note was dictated using voice recognition software. Any grammatical or context distortions are unintentional and inherent to the software.  _________________________________  Margie Cutler PA-C  [facility] PHYSICAL MEDICINE AND REHABILITATION CLINIC [facility]   Patient returns for a follow up. Briefly, This 44 year old female presents to the [facility] Rehabilitation Medicine Post-COVID clinic as a new consult on 9/24/24 to evaluate continuing symptoms after viral infection initially diagnosed 8/23/24.   Kristin Page presented to urgent care on 8/23/24 complaining of  sinus pressure/ congestion, shortness of breath, cough, headache, and fatigue. Treatment was antibiotics for sinus infection. Covid testing was negative.   Patient does have a history of Long COVID after COVID infection 10/2021. Kristin Page experienced complications of post exertional malaise, fatigue.  Continuing symptoms include fatigue, weakness, cough, shortness of breath, headache, difficulty sleeping, numbness, tingling, brain fog, and distractibility.     Patient still has a cough in the am but its improved.   She saw [facility] and was placed on antibiotics and prednisone. She did have increase anxiety with prednisone.   She is having a CT later this week.   She has not been having chest pain but does have chest tightness.  She is less short of breath with activity.  Patients fatigue and concentration is worse due to prednisone causing insomnia. She did start LDN last night.  Abdominal bloating is the same.  She has more numbness and tingling in both hands.  It occurs when she is laying down.  This stops at her elbow, this is mostly on the outsides.  Patient has neck pain.  Patient prior to the prednisone was having sleep issues. She is having more trouble falling asleep.  She does wake up at 2-3 am.  Melatonin will help her fall asleep.     Current concerns: Health Concerns      10/21/2024     7:06 AM   PHQ Assesment Total Score(s)   PHQ-9 Score 7           10/21/2024     7:05 AM   MGANUS-7 Results   MAGNUS 7 TOTAL SCORE 6 (mild anxiety)   MAGNUS-7 Total Score 6         10/21/2024     7:04 AM   PTSD Screen Score   Have you ever experienced this kind of event? Yes   PTSD Screen (Score of 3 or more suggests positive screen) 3         10/21/2024     7:04 AM   PROMIS-29   PROMIS Physical Function T-Score 40 (mild dysfunction)   PROMIS Anxiety T-Score 56 (mild)   PROMIS Depression T-Score 49 (within normal limits)   PROMIS Fatigue T-Score 65 (moderate)    PROMIS Sleep Disturbance T-Score 62 (moderate)   PROMIS Ability to Participate in Social Roles & Activities T-Score 39 (moderate dysfunction)   PROMIS Pain Interference T-Score 56 (mild)   PROMIS Pain Intensity 3     Past Medical History:   Diagnosis Date    NO ACTIVE PROBLEMS     Pap smear abnormality of anus, abnormal glandular cells     2008       Past Surgical History:   Procedure Laterality Date    no history of surgery         Family History   Problem Relation Age of Onset    Alzheimer Disease Maternal Grandmother     Cerebrovascular Disease Maternal Grandmother     C.A.D. Maternal Grandfather     Cancer - colorectal Paternal Grandfather     Thyroid Disease Sister        Social History     Tobacco Use    Smoking status: Never    Smokeless tobacco: Never   Substance Use Topics    Alcohol use: No     Alcohol/week: 0.0 standard drinks of alcohol    Drug use: No         Current Outpatient Medications:     cetirizine (ZYRTEC) 10 MG tablet, Take 10 mg by mouth daily., Disp: , Rfl:     Omega-3 Fatty Acids (OMEGA-3 FISH OIL PO), Take 1 g by mouth, Disp: , Rfl:     fluticasone (FLONASE) 50 MCG/ACT nasal spray, Spray 2 sprays into both nostrils., Disp: , Rfl:     oseltamivir (TAMIFLU) 75 MG capsule, Take 1 capsule (75 mg) by mouth 2 times daily (Patient not taking: Reported on 9/24/2024), Disp: 10 capsule, Rfl: 0    PRENATAL VITAMINS PO, Take 1 tablet by mouth daily. (Patient not taking: Reported on 9/24/2024), Disp: , Rfl:     Review of Systems   Constitutional, HEENT, cardiovascular, pulmonary, GI, , musculoskeletal, neuro, skin, endocrine and psych systems are negative, except as otherwise noted.      Objective         Vitals:  No vitals were obtained today due to virtual visit.    Physical Exam   EYES: Eyes grossly normal to inspection.  No discharge or erythema, or obvious scleral/conjunctival abnormalities.  SKIN: Visible skin clear. No significant rash, abnormal pigmentation or lesions.  NEURO: Cranial nerves  grossly intact.  Mentation and speech appropriate for age.  GENERAL: Healthy, alert and no distress  RESP: No audible wheeze, cough, or visible cyanosis.  No visible retractions or increased work of breathing.    PSYCH: Mentation appears normal, affect normal/bright, judgement and insight intact, normal speech and appearance well-groomed.    Labs :  Lab on 10/03/2024   Component Date Value Ref Range Status    Ferritin 10/03/2024 42  6 - 175 ng/mL Final    Iron 10/03/2024 97  37 - 145 ug/dL Final    Vitamin B12 10/03/2024 638  232 - 1,245 pg/mL Final    Vitamin D, Total (25-Hydroxy) 10/03/2024 32  20 - 50 ng/mL Final    optimum levels    TSH 10/03/2024 1.61  0.30 - 4.20 uIU/mL Final    Free T4 10/03/2024 1.12  0.90 - 1.70 ng/dL Final    T3 Total 10/03/2024 97  85 - 202 ng/dL Final    CRP Inflammation 10/03/2024 <3.00  <5.00 mg/L Final    Sodium 10/03/2024 138  135 - 145 mmol/L Final    Potassium 10/03/2024 4.1  3.4 - 5.3 mmol/L Final    Chloride 10/03/2024 103  98 - 107 mmol/L Final    Carbon Dioxide (CO2) 10/03/2024 26  22 - 29 mmol/L Final    Anion Gap 10/03/2024 9  7 - 15 mmol/L Final    Urea Nitrogen 10/03/2024 8.6  6.0 - 20.0 mg/dL Final    Creatinine 10/03/2024 0.91  0.51 - 0.95 mg/dL Final    GFR Estimate 10/03/2024 79  >60 mL/min/1.73m2 Final    eGFR calculated using 2021 CKD-EPI equation.    Calcium 10/03/2024 9.7  8.8 - 10.4 mg/dL Final    Reference intervals for this test were updated on 7/16/2024 to reflect our healthy population more accurately. There may be differences in the flagging of prior results with similar values performed with this method. Those prior results can be interpreted in the context of the updated reference intervals.    Glucose 10/03/2024 106 (H)  70 - 99 mg/dL Final    WBC Count 10/03/2024 4.9  4.0 - 11.0 10e3/uL Final    RBC Count 10/03/2024 4.47  3.80 - 5.20 10e6/uL Final    Hemoglobin 10/03/2024 14.3  11.7 - 15.7 g/dL Final    Hematocrit 10/03/2024 40.9  35.0 - 47.0 % Final     MCV 10/03/2024 92  78 - 100 fL Final    MCH 10/03/2024 32.0  26.5 - 33.0 pg Final    MCHC 10/03/2024 35.0  31.5 - 36.5 g/dL Final    RDW 10/03/2024 11.9  10.0 - 15.0 % Final    Platelet Count 10/03/2024 275  150 - 450 10e3/uL Final    % Neutrophils 10/03/2024 52  % Final    % Lymphocytes 10/03/2024 40  % Final    % Monocytes 10/03/2024 5  % Final    % Eosinophils 10/03/2024 2  % Final    % Basophils 10/03/2024 0  % Final    % Immature Granulocytes 10/03/2024 0  % Final    NRBCs per 100 WBC 10/03/2024 0  <1 /100 Final    Absolute Neutrophils 10/03/2024 2.6  1.6 - 8.3 10e3/uL Final    Absolute Lymphocytes 10/03/2024 2.0  0.8 - 5.3 10e3/uL Final    Absolute Monocytes 10/03/2024 0.3  0.0 - 1.3 10e3/uL Final    Absolute Eosinophils 10/03/2024 0.1  0.0 - 0.7 10e3/uL Final    Absolute Basophils 10/03/2024 0.0  0.0 - 0.2 10e3/uL Final    Absolute Immature Granulocytes 10/03/2024 0.0  <=0.4 10e3/uL Final    Absolute NRBCs 10/03/2024 0.0  10e3/uL Final         Imaging:    I personally reviewed the following imaging results today and those on care everywhere, if indicated     Nothing new to review    Reviewed imaging from [facility] sites and [facility]     Medical Records Reviewed:    Reviewed consults/documents from [facility] and [facility] including : Lab.  Outside ENT not available.

## 2024-11-07 ENCOUNTER — TELEPHONE (OUTPATIENT)
Dept: PHYSICAL MEDICINE AND REHAB | Facility: CLINIC | Age: 44
End: 2024-11-07
Payer: COMMERCIAL

## 2024-11-07 NOTE — TELEPHONE ENCOUNTER
Patient confirmed scheduled appointment:  Date: 12/5/2024  Time: 3:00pm  Visit type: Post covid return video  Provider: Margie Cutler  Location: virtual  Testing/imaging: NA  Additional notes: 6 week follow up    Rachel Jett on 11/7/2024 at 12:10 PM

## 2024-11-07 NOTE — TELEPHONE ENCOUNTER
Writer received request from  Rachel, stating that she had just spoken with this patient to schedule follow up visit with Margie Cutler PA-C, and patient requested a message be sent to clinical team that she would like her recent prescription of her low dose naltrexone be filled as liquid form instead of tablet or capsule.      Writer called [facility] and spoke with pharmacist, Evie.  She was able to change the prescription for patient's low dose naltrexone to liquid form, read as Low dose naltrexone 1mg/mL, take 1mL by mouth daily, Disp. 30 mL, 1 RF.    Pharmacist states that this is still not covered by patient's insurance, but that it is less expensive than the capsule/tablet form.  She stated that they will contact the patient in the next day to get her set up for delivery.    Writer called patient and informed her of the above information.  She verbalized understanding of this and thanked writer for calling and getting this changed.    Barbara Odonnell RN on 11/7/2024 at 1:09 PM

## 2024-11-18 ENCOUNTER — MYC MEDICAL ADVICE (OUTPATIENT)
Dept: PHYSICAL MEDICINE AND REHAB | Facility: CLINIC | Age: 44
End: 2024-11-18
Payer: COMMERCIAL

## 2024-11-18 DIAGNOSIS — G93.32 POST-COVID CHRONIC FATIGUE: ICD-10-CM

## 2024-11-18 DIAGNOSIS — U09.9 POST-COVID CHRONIC FATIGUE: ICD-10-CM

## 2024-11-18 DIAGNOSIS — R41.840 POST-COVID CHRONIC CONCENTRATION DEFICIT: ICD-10-CM

## 2024-11-18 DIAGNOSIS — U09.9 POST-COVID CHRONIC CONCENTRATION DEFICIT: ICD-10-CM

## 2024-11-18 DIAGNOSIS — R41.841 COGNITIVE COMMUNICATION DEFICIT: ICD-10-CM

## 2024-12-09 ENCOUNTER — TRANSFERRED RECORDS (OUTPATIENT)
Dept: HEALTH INFORMATION MANAGEMENT | Facility: CLINIC | Age: 44
End: 2024-12-09
Payer: COMMERCIAL

## 2025-01-16 DIAGNOSIS — R41.840 POST-COVID CHRONIC CONCENTRATION DEFICIT: ICD-10-CM

## 2025-01-16 DIAGNOSIS — U09.9 POST-COVID CHRONIC FATIGUE: ICD-10-CM

## 2025-01-16 DIAGNOSIS — G93.32 POST-COVID CHRONIC FATIGUE: ICD-10-CM

## 2025-01-16 DIAGNOSIS — R41.841 COGNITIVE COMMUNICATION DEFICIT: ICD-10-CM

## 2025-01-16 DIAGNOSIS — U09.9 POST-COVID CHRONIC CONCENTRATION DEFICIT: ICD-10-CM

## 2025-04-14 DIAGNOSIS — Z84.1 FAMILY HISTORY OF KIDNEY DISEASE: Primary | ICD-10-CM

## 2025-04-14 DIAGNOSIS — Z13.71 ENCOUNTER FOR NONPROCREATIVE GENETIC COUNSELING AND TESTING: ICD-10-CM

## 2025-04-14 DIAGNOSIS — Z71.83 ENCOUNTER FOR NONPROCREATIVE GENETIC COUNSELING AND TESTING: ICD-10-CM
